# Patient Record
Sex: MALE | Race: WHITE
[De-identification: names, ages, dates, MRNs, and addresses within clinical notes are randomized per-mention and may not be internally consistent; named-entity substitution may affect disease eponyms.]

---
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
## 2022-05-19 ENCOUNTER — EMERGENCY (EMERGENCY)
Facility: HOSPITAL | Age: 22
LOS: 0 days | Discharge: HOME | End: 2022-05-19
Attending: EMERGENCY MEDICINE | Admitting: EMERGENCY MEDICINE
Payer: COMMERCIAL

## 2022-05-19 VITALS
WEIGHT: 197.09 LBS | TEMPERATURE: 99 F | SYSTOLIC BLOOD PRESSURE: 130 MMHG | HEART RATE: 83 BPM | DIASTOLIC BLOOD PRESSURE: 64 MMHG | RESPIRATION RATE: 17 BRPM | OXYGEN SATURATION: 100 %

## 2022-05-19 VITALS
RESPIRATION RATE: 18 BRPM | DIASTOLIC BLOOD PRESSURE: 76 MMHG | OXYGEN SATURATION: 100 % | TEMPERATURE: 99 F | SYSTOLIC BLOOD PRESSURE: 127 MMHG | HEART RATE: 61 BPM

## 2022-05-19 DIAGNOSIS — R19.7 DIARRHEA, UNSPECIFIED: ICD-10-CM

## 2022-05-19 DIAGNOSIS — T88.6XXA ANAPHYLACTIC REACTION DUE TO ADVERSE EFFECT OF CORRECT DRUG OR MEDICAMENT PROPERLY ADMINISTERED, INITIAL ENCOUNTER: ICD-10-CM
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
## 2022-05-19 DIAGNOSIS — Z90.49 ACQUIRED ABSENCE OF OTHER SPECIFIED PARTS OF DIGESTIVE TRACT: Chronic | ICD-10-CM

## 2022-05-19 DIAGNOSIS — T36.3X5A ADVERSE EFFECT OF MACROLIDES, INITIAL ENCOUNTER: ICD-10-CM
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
## 2022-05-19 DIAGNOSIS — R20.2 PARESTHESIA OF SKIN: ICD-10-CM

## 2022-05-19 DIAGNOSIS — R11.2 NAUSEA WITH VOMITING, UNSPECIFIED: ICD-10-CM

## 2022-05-19 DIAGNOSIS — F17.290 NICOTINE DEPENDENCE, OTHER TOBACCO PRODUCT, UNCOMPLICATED: ICD-10-CM

## 2022-05-19 DIAGNOSIS — R10.9 UNSPECIFIED ABDOMINAL PAIN: ICD-10-CM

## 2022-05-19 PROCEDURE — 99291 CRITICAL CARE FIRST HOUR: CPT

## 2022-05-19 RX ORDER — ONDANSETRON 8 MG/1
4 TABLET, FILM COATED ORAL ONCE
Refills: 0 | Status: COMPLETED | OUTPATIENT
Start: 2022-05-19 | End: 2022-05-19

## 2022-05-19 RX ORDER — SODIUM CHLORIDE 9 MG/ML
1000 INJECTION, SOLUTION INTRAVENOUS ONCE
Refills: 0 | Status: COMPLETED | OUTPATIENT
Start: 2022-05-19 | End: 2022-05-19

## 2022-05-19 RX ORDER — ONDANSETRON 8 MG/1
4 TABLET, FILM COATED ORAL ONCE
Refills: 0 | Status: DISCONTINUED | OUTPATIENT
Start: 2022-05-19 | End: 2022-05-19

## 2022-05-19 RX ORDER — EPINEPHRINE 0.3 MG/.3ML
0.3 INJECTION INTRAMUSCULAR; SUBCUTANEOUS ONCE
Refills: 0 | Status: COMPLETED | OUTPATIENT
Start: 2022-05-19 | End: 2022-05-19

## 2022-05-19 RX ORDER — FAMOTIDINE 10 MG/ML
20 INJECTION INTRAVENOUS ONCE
Refills: 0 | Status: COMPLETED | OUTPATIENT
Start: 2022-05-19 | End: 2022-05-19

## 2022-05-19 RX ORDER — DIPHENHYDRAMINE HCL 50 MG
50 CAPSULE ORAL ONCE
Refills: 0 | Status: COMPLETED | OUTPATIENT
Start: 2022-05-19 | End: 2022-05-19

## 2022-05-19 RX ORDER — FAMOTIDINE 10 MG/ML
20 INJECTION INTRAVENOUS ONCE
Refills: 0 | Status: DISCONTINUED | OUTPATIENT
Start: 2022-05-19 | End: 2022-05-19

## 2022-05-19 RX ORDER — DIPHENHYDRAMINE HCL 50 MG
50 CAPSULE ORAL ONCE
Refills: 0 | Status: DISCONTINUED | OUTPATIENT
Start: 2022-05-19 | End: 2022-05-19

## 2022-05-19 RX ORDER — EPINEPHRINE 0.3 MG/.3ML
0.3 INJECTION INTRAMUSCULAR; SUBCUTANEOUS
Qty: 2 | Refills: 0
Start: 2022-05-19

## 2022-05-19 RX ADMIN — Medication 125 MILLIGRAM(S): at 13:48

## 2022-05-19 RX ADMIN — SODIUM CHLORIDE 1000 MILLILITER(S): 9 INJECTION, SOLUTION INTRAVENOUS at 13:54

## 2022-05-19 RX ADMIN — Medication 50 MILLIGRAM(S): at 13:48

## 2022-05-19 RX ADMIN — FAMOTIDINE 104 MILLIGRAM(S): 10 INJECTION INTRAVENOUS at 13:48

## 2022-05-19 RX ADMIN — ONDANSETRON 4 MILLIGRAM(S): 8 TABLET, FILM COATED ORAL at 13:48

## 2022-05-19 RX ADMIN — EPINEPHRINE 0.3 MILLIGRAM(S): 0.3 INJECTION INTRAMUSCULAR; SUBCUTANEOUS at 13:54

## 2022-05-19 NOTE — ED PROVIDER NOTE - CARE PROVIDER_API CALL
Chichi Pierce  ALLERGY AND IMMUNOLOGY  [address]  [place] [address]  Phone: [phone]  Fax: [phone]  Follow Up Time: 1-3 Days

## 2022-05-19 NOTE — ED PROVIDER NOTE - PHYSICAL EXAMINATION
_  Vital signs reviewed; ABCs intact  GENERAL: Well nourished, comfortable  SKIN: Warm, dry  HEAD & NECK: NCAT, supple neck  EYES: EOMI, PER B/L, no scleral icterus, no conjunctival injection, no conjunctival pallor  ENT: MMM; uvula midline; no oropharyngeal erythema or exudates  CARD: RRR, S1, S2; no murmurs, no rubs, no gallops  RESP: Normal respiratory effort, CTAB, no rales, no wheezing  ABD: Soft, ND, NT, no rebound, no guarding, +BS; no CVAT  EXT: No edema; pulses palpable distally; no calf tenderness; symmetrical calf circumferences  NEUROMSK: Grossly intact  PSYCH: AAOx3, cooperative, appropriate

## 2022-05-19 NOTE — ED ADULT NURSE NOTE - OBJECTIVE STATEMENT
Pt c/o abdominal pain, vomiting & diarrhea ~ 20-25 minutes s/p taking Azithromycin prescribed to treat current URI. Emesis NBNB, diarrhea non-bloody. Denies fever/chills. No prior home remedies.

## 2022-05-19 NOTE — ED PROVIDER NOTE - ATTENDING CONTRIBUTION TO CARE
21-year-old male with no past medical history no known allergies, presenting with possible allergic reaction.  At around 930 this morning patient took 2 tablets of azithromycin and about 25 to 30 minutes after he started complaining of some abdominal pain, and had episodes of diarrhea and vomiting.  Nonbloody nonbilious emesis and nonbloody diarrhea.  Patient feels improved now.  Patient also noted he had some lip tingling and mild swelling compared to normal per the father.  No throat itching or throat tightening.  No other rash.  No fever.  Patient was started on the azithromycin by urgent care for congestion and thick nasal discharge.  Patient has had this chronic nasal congestion and discharge for about 2 months.  Exam - Gen - NAD, Head - NCAT, Pharynx - clear, MMM, no edema, lips with mildly more erythematous than normal and slightly more full than normal, Heart - RRR, no m/g/r, Lungs - CTAB, no w/c/r, Abdomen - soft, NT, ND, Skin - No rash, Extremities - FROM, no edema, erythema, ecchymosis, Neuro - CN 2-12 intact, nl strength and sensation, nl gait.  Diagnosis–possible anaphylaxis as to systems are involved.  Plan–epi, Benadryl, Pepcid, Zofran, steroids.  Patient to be absent for 4 hours status post epinephrine.  Advised patient avoid azithromycin and follow-up with allergy for testing. 21-year-old male with no past medical history no known allergies, presenting with possible allergic reaction.  At around 930 this morning patient took 2 tablets of azithromycin and about 25 to 30 minutes after he started complaining of some abdominal pain, and had episodes of diarrhea and vomiting.  Nonbloody nonbilious emesis and nonbloody diarrhea.  Patient feels improved now.  Patient also noted he had some lip tingling and mild swelling compared to normal per the father.  No throat itching or throat tightening.  No other rash.  No fever.  Patient was started on the azithromycin by urgent care for congestion and thick nasal discharge.  Patient has had this chronic nasal congestion and discharge for about 2 months.  Exam - Gen - NAD, Head - NCAT, Pharynx - clear, MMM, no edema, lips with mildly more erythematous than normal and slightly more full than normal, Heart - RRR, no m/g/r, Lungs - CTAB, no w/c/r, Abdomen - soft, NT, ND, Skin - No rash, Extremities - FROM, no edema, erythema, ecchymosis, Neuro - CN 2-12 intact, nl strength and sensation, nl gait.  Diagnosis–possible anaphylaxis as to systems are involved.  Plan–epi, Benadryl, Pepcid, Zofran, steroids.  Patient to be observed for 4 hours status post epinephrine. Pt did well, improved rash, no vomiting. Pt d/aye home and advised PMD f/u. Advised patient avoid azithromycin and follow-up with allergy for testing.

## 2022-05-19 NOTE — ED PROVIDER NOTE - NSFOLLOWUPINSTRUCTIONS_ED_ALL_ED_FT
Your visit in the emergency department today did not reveal anything immediately life-threatening.    However, it is important that you follow-up with your PRIMARY CARE PHYSICIAN within 3-5 days.    Additionally, it is important that you follow-up with ALLERGIST. If you are unable to schedule a visit(s) with the provided specialist(s), please speak with your primary care doctor for guidance to such a specialist.    ---------------------------------------------------------------------------------------------------    Anaphylaxis    An anaphylactic reaction (anaphylaxis) is a sudden, severe allergic reaction that affects multiple areas of the body. An allergic reaction is an abnormal reaction to a substance (allergen) by the body's defense system. Common allergens include medicines, food, insect bites or stings, and blood products. The body releases certain proteins into the blood that can cause a variety of symptoms such as an itchy rash, wheezing, swelling of the face/lips/tongue/throat, abdominal pain, nausea or vomiting. An allergic reaction is usually treated with medication. If your health care provider prescribed you an epinephrine injection device, make sure to keep it with you at all times.    SEEK IMMEDIATE MEDICAL CARE IF YOU HAVE ANY OF THE FOLLOWING SYMPTOMS: allergic reaction severe enough that required you to use epinephrine, tightness in your chest, swelling around your lips/tongue/throat, abdominal pain, vomiting or diarrhea, or lightheadedness/dizziness. These symptoms may represent a serious problem that is an emergency. Do not wait to see if the symptoms will go away. Use your auto-injector pen or anaphylaxis kit as you have been instructed. Call 911 and do not drive yourself to the hospital.    ---------------------------------------------------------------------------------------------------    How to Use an Auto-Injector Pen    An auto-injector pen (pre-filled automatic epinephrine injection device) is a device that is used to deliver epinephrine to the body. Epinephrine is a medicine that is given as a shot (injection). It works by relaxing the muscles in the airways and tightening the blood vessels. It is used to treat:  •A life-threatening allergic reaction (anaphylaxis).  •Serious breathing problems, such as severe asthma attacks, some lung problems, and other emergency conditions.    An epinephrine injection can save your life. You should always carry an auto-injector pen with you if you are at risk for severe asthma attacks or anaphylaxis. You may hear other names for an auto-injector pen. They are epinephrine injection, epinephrine auto-injector pen, epinephrine pen, and automatic injection device.    What are the risks?    Using the auto-injector pen is safe. However, problems may arise, including:  •Damage to bone or tissue. Make sure that you correctly place the needle in the muscle of your outer thigh as told by your health care provider.    When should I use my auto-injector pen?  Use your auto-injector pen as soon as you think you are experiencing anaphylaxis or a severe asthma attack. Anaphylaxis is very dangerous if it is not treated right away.    Signs and symptoms of anaphylaxis may include:  •Feeling warm in the face (flushed). This may include redness.  •Itchy, red, swollen areas of skin (hives).  •Swelling of the eyes, lips, face, mouth, tongue, or throat.  •Difficulty breathing, speaking, or swallowing.  •Noisy breathing (wheezing).  •Dizziness or light-headedness.  •Fainting.  •Pain or cramping in the abdomen.  •Vomiting.  •Diarrhea.    These symptoms may represent a serious problem that is an emergency. Do not wait to see if the symptoms will go away. Use your auto-injector pen as you have been instructed, and get medical help right away. Call your local emergency services (911 in the [place] Do not drive yourself to the hospital.    General tips for using an auto-injector pen  •Use epinephrine exactly as told by your health care provider. Do not inject it more often or in greater or smaller doses than your health care provider told you. Most auto-injector pens contain one dose of epinephrine. Some contain two doses.  •Use the auto-injector pen to give yourself an injection under your skin or into your muscle on the outer side of your thigh. Do not give yourself an injection into your buttocks or any other part of your body.  •In an emergency, you can use your auto-injector pen through your clothing.  •After you inject a dose of epinephrine, some liquid may remain in your auto-injector pen. This is normal.  •If you need to give yourself a second dose of epinephrine, give the second injection in another area on your outer thigh. Do not give two injections in exactly the same place on your body. This can lead to tissue damage.  •From time to time:   •Check the expiration date on your auto-injector pen.   •Check the solution to ensure that it is not cloudy and that there are no particles floating in it. If your auto-injector pen is  or if the solution is cloudy or has particles floating in it, throw it away and get a new one.  •Ask your health care provider how to safely get rid of used or  auto-injector pens.  •Talk with your pharmacist or health care provider if you have questions about how to inject epinephrine correctly.    Get help right away if:  •You inject epinephrine. You may need additional medical care, and you may need to be monitored for the side effects of epinephrine. The side effects include:  •Difficulty breathing.  •Fast or irregular heartbeat.  •Nausea or vomiting.  •Sweating.  •Dizziness.  •Nervousness or anxiety.  •Weakness.  •Pale skin.  •Headache.  •Shaking that does not stop.    Summary  •An auto-injector pen (pre-filled automatic epinephrine injection device) is a device that is used to deliver epinephrine to the body.  •An auto-injector pen is used to treat a life-threatening allergic reaction (anaphylaxis), asthma attack, or other emergency conditions.  •You should always carry an auto-injector pen with you if you are at risk for anaphylaxis or severe asthma attacks.  •Use of this device is safe. However, bone or tissue damage can occur if you do not follow instructions for injecting the medicine.  •Talk with your pharmacist or health care provider if you have questions about how to inject epinephrine correctly.    This information is not intended to replace advice given to you by your health care provider. Make sure you discuss any questions you have with your health care provider.    Document Revised: 2020 Document Reviewed: 2020  Elsevier Patient Education ©  [employer] Your visit in the emergency department today did not reveal anything immediately life-threatening.    However, it is important that you follow-up with your PRIMARY CARE PHYSICIAN within 3-5 days.    You MAY or MAY NOT have had an allergic reaction to Azithromycin. Avoid the medication until you are evaluated by an ALLERGIST.     If you are unable to schedule a visit(s) with the provided specialist(s), please speak with your primary care doctor for guidance to such a specialist.    ---------------------------------------------------------------------------------------------------    Anaphylaxis    An anaphylactic reaction (anaphylaxis) is a sudden, severe allergic reaction that affects multiple areas of the body. An allergic reaction is an abnormal reaction to a substance (allergen) by the body's defense system. Common allergens include medicines, food, insect bites or stings, and blood products. The body releases certain proteins into the blood that can cause a variety of symptoms such as an itchy rash, wheezing, swelling of the face/lips/tongue/throat, abdominal pain, nausea or vomiting. An allergic reaction is usually treated with medication. If your health care provider prescribed you an epinephrine injection device, make sure to keep it with you at all times.    SEEK IMMEDIATE MEDICAL CARE IF YOU HAVE ANY OF THE FOLLOWING SYMPTOMS: allergic reaction severe enough that required you to use epinephrine, tightness in your chest, swelling around your lips/tongue/throat, abdominal pain, vomiting or diarrhea, or lightheadedness/dizziness. These symptoms may represent a serious problem that is an emergency. Do not wait to see if the symptoms will go away. Use your auto-injector pen or anaphylaxis kit as you have been instructed. Call 911 and do not drive yourself to the hospital.    ---------------------------------------------------------------------------------------------------    How to Use an Auto-Injector Pen    An auto-injector pen (pre-filled automatic epinephrine injection device) is a device that is used to deliver epinephrine to the body. Epinephrine is a medicine that is given as a shot (injection). It works by relaxing the muscles in the airways and tightening the blood vessels. It is used to treat:  •A life-threatening allergic reaction (anaphylaxis).  •Serious breathing problems, such as severe asthma attacks, some lung problems, and other emergency conditions.    An epinephrine injection can save your life. You should always carry an auto-injector pen with you if you are at risk for severe asthma attacks or anaphylaxis. You may hear other names for an auto-injector pen. They are epinephrine injection, epinephrine auto-injector pen, epinephrine pen, and automatic injection device.    What are the risks?    Using the auto-injector pen is safe. However, problems may arise, including:  •Damage to bone or tissue. Make sure that you correctly place the needle in the muscle of your outer thigh as told by your health care provider.    When should I use my auto-injector pen?  Use your auto-injector pen as soon as you think you are experiencing anaphylaxis or a severe asthma attack. Anaphylaxis is very dangerous if it is not treated right away.    Signs and symptoms of anaphylaxis may include:  •Feeling warm in the face (flushed). This may include redness.  •Itchy, red, swollen areas of skin (hives).  •Swelling of the eyes, lips, face, mouth, tongue, or throat.  •Difficulty breathing, speaking, or swallowing.  •Noisy breathing (wheezing).  •Dizziness or light-headedness.  •Fainting.  •Pain or cramping in the abdomen.  •Vomiting.  •Diarrhea.    These symptoms may represent a serious problem that is an emergency. Do not wait to see if the symptoms will go away. Use your auto-injector pen as you have been instructed, and get medical help right away. Call your local emergency services (911 in the [place] Do not drive yourself to the hospital.    General tips for using an auto-injector pen  •Use epinephrine exactly as told by your health care provider. Do not inject it more often or in greater or smaller doses than your health care provider told you. Most auto-injector pens contain one dose of epinephrine. Some contain two doses.  •Use the auto-injector pen to give yourself an injection under your skin or into your muscle on the outer side of your thigh. Do not give yourself an injection into your buttocks or any other part of your body.  •In an emergency, you can use your auto-injector pen through your clothing.  •After you inject a dose of epinephrine, some liquid may remain in your auto-injector pen. This is normal.  •If you need to give yourself a second dose of epinephrine, give the second injection in another area on your outer thigh. Do not give two injections in exactly the same place on your body. This can lead to tissue damage.  •From time to time:   •Check the expiration date on your auto-injector pen.   •Check the solution to ensure that it is not cloudy and that there are no particles floating in it. If your auto-injector pen is  or if the solution is cloudy or has particles floating in it, throw it away and get a new one.  •Ask your health care provider how to safely get rid of used or  auto-injector pens.  •Talk with your pharmacist or health care provider if you have questions about how to inject epinephrine correctly.    Get help right away if:  •You inject epinephrine. You may need additional medical care, and you may need to be monitored for the side effects of epinephrine. The side effects include:  •Difficulty breathing.  •Fast or irregular heartbeat.  •Nausea or vomiting.  •Sweating.  •Dizziness.  •Nervousness or anxiety.  •Weakness.  •Pale skin.  •Headache.  •Shaking that does not stop.    Summary  •An auto-injector pen (pre-filled automatic epinephrine injection device) is a device that is used to deliver epinephrine to the body.  •An auto-injector pen is used to treat a life-threatening allergic reaction (anaphylaxis), asthma attack, or other emergency conditions.  •You should always carry an auto-injector pen with you if you are at risk for anaphylaxis or severe asthma attacks.  •Use of this device is safe. However, bone or tissue damage can occur if you do not follow instructions for injecting the medicine.  •Talk with your pharmacist or health care provider if you have questions about how to inject epinephrine correctly.    This information is not intended to replace advice given to you by your health care provider. Make sure you discuss any questions you have with your health care provider.    Document Revised: 2020 Document Reviewed: 2020  Elsevier Patient Education ©  [employer]

## 2022-05-19 NOTE — ED PROVIDER NOTE - OBJECTIVE STATEMENT
Patient is a 21 year old male, without any past medical history, presenting for a potential reaction to Azithromycin 500 mg, ~0930 today. Approximately 30 minutes after taking the first dose of Azithromycin, patient developed lip tingling, sensation of his lips being "pursed," NBNB emesis x multiple times, Patient is a 21 year old male, without any past medical history, presenting for a potential reaction to Azithromycin 500 mg, ~0930 today. Approximately 30 minutes after taking the first dose of Azithromycin, patient developed lip tingling, sensation of his lips being "pursed," NBNB emesis x multiple times, NB diarrhea. Patient tried to take his mother's anti-nausea medication (unsure of name, but was unable to keep it down). No chest tightness, angioedema, abdominal pain, lightheadedness/dizziness, rash, pruritis. Patient and father are not sure if he has taken this medication in the past. No known allergens.   Of note, the reason the patient is taking Azithromycin is because he has been having a cough for 2 months, not associated with fever. PMD is following. Patient is a daily tobacco vape user. Patient is a 21 year old male, without any past medical history, presenting for a potential reaction to Azithromycin 500 mg, ~0930 today. Approximately 30 minutes after taking the first dose of Azithromycin, patient developed lip tingling, sensation of his lips being "pursed," NBNB emesis x multiple times, NB diarrhea. Patient tried to take his mother's anti-nausea medication (unsure of name, but was unable to keep it down). No chest tightness, angioedema, abdominal pain, lightheadedness/dizziness, rash, pruritis. Patient and father are not sure if he has taken this medication in the past. No known allergens.   Of note, the reason the patient is taking Azithromycin is because he has been having a cough for 2 months, not associated with fever. PMD is following. Patient is a daily tobacco vape user.  No other complaints - no sore throat, CP, palpitations, SOB, cough, dysuria, hematuria, new joint pain, FND, trauma.

## 2022-05-19 NOTE — ED ADULT TRIAGE NOTE - CHIEF COMPLAINT QUOTE
"I took 2 tablets of Zithromax at 9:30 this morning and I started feeling sick 20-25 minutes after that. My belly hurt & I had diarrhea and vomited."

## 2022-05-19 NOTE — ED PROVIDER NOTE - PROGRESS NOTE DETAILS
Resident AO: Had a discussion with patient and father at bedside regarding tobacco and vape use, and its dangers.   Patient received epinephrine, and will be observed until 18:00. Resident AO: Patient asymptomatic and stable. Will continue to monitor. Resident AO: Patient remains asymptomatic through ED observation period. Feels better and has no complaints. Dad and patient comfortable with discharge, and agree to follow up with PMD and allergist. Return precautions given. Epinephrine pen sent to pharmacy.

## 2022-05-19 NOTE — ED PROVIDER NOTE - CLINICAL SUMMARY MEDICAL DECISION MAKING FREE TEXT BOX
21-year-old male with no past medical history no known allergies, presenting with possible allergic reaction.  At around 930 this morning patient took 2 tablets of azithromycin and about 25 to 30 minutes after he started complaining of some abdominal pain, and had episodes of diarrhea and vomiting.  Nonbloody nonbilious emesis and nonbloody diarrhea.  Patient feels improved now.  Patient also noted he had some lip tingling and mild swelling compared to normal per the father.  No throat itching or throat tightening.  No other rash.  No fever.  Patient was started on the azithromycin by urgent care for congestion and thick nasal discharge.  Patient has had this chronic nasal congestion and discharge for about 2 months.  Exam - Gen - NAD, Head - NCAT, Pharynx - clear, MMM, no edema, lips with mildly more erythematous than normal and slightly more full than normal, Heart - RRR, no m/g/r, Lungs - CTAB, no w/c/r, Abdomen - soft, NT, ND, Skin - No rash, Extremities - FROM, no edema, erythema, ecchymosis, Neuro - CN 2-12 intact, nl strength and sensation, nl gait.  Diagnosis–possible anaphylaxis as to systems are involved.  Plan–epi, Benadryl, Pepcid, Zofran, steroids.  Patient to be observed for 4 hours status post epinephrine. Pt did well, improved rash, no vomiting. Pt d/aye home and advised PMD f/u. Advised patient avoid azithromycin and follow-up with allergy for testing.

## 2022-05-19 NOTE — ED PROVIDER NOTE - PATIENT PORTAL LINK FT
You can access the FollowMyHealth Patient Portal offered by [facility] by registering at the following website: [email or web address]. By joining [facility]’s FollowMyHealth portal, you will also be able to view your health information using other applications (apps) compatible with our system.

## 2022-05-19 NOTE — ED ADULT NURSE NOTE - NSIMPLEMENTINTERV_GEN_ALL_ED
Implemented All Universal Safety Interventions:  [place] to call system. Call bell, personal items and telephone within reach. Instruct patient to call for assistance. Room bathroom lighting operational. Non-slip footwear when patient is off stretcher. Physically safe environment: no spills, clutter or unnecessary equipment. Stretcher in lowest position, wheels locked, appropriate side rails in place.

## 2023-02-23 ENCOUNTER — APPOINTMENT (OUTPATIENT)
Dept: ORTHOPEDIC SURGERY | Facility: CLINIC | Age: 23
End: 2023-02-23
Payer: COMMERCIAL

## 2023-02-23 VITALS — HEIGHT: 72 IN | WEIGHT: 195 LBS | BODY MASS INDEX: 26.41 KG/M2

## 2023-02-23 DIAGNOSIS — S62.336A DISPLACED FRACTURE OF NECK OF FIFTH METACARPAL BONE, RIGHT HAND, INITIAL ENCOUNTER FOR CLOSED FRACTURE: ICD-10-CM

## 2023-02-23 PROCEDURE — 29085 APPL CAST HAND&LWR FOREARM: CPT | Mod: RT

## 2023-02-23 PROCEDURE — 99203 OFFICE O/P NEW LOW 30 MIN: CPT | Mod: 25

## 2023-02-23 PROCEDURE — 73130 X-RAY EXAM OF HAND: CPT | Mod: 76,RT

## 2023-02-23 NOTE — IMAGING
[de-identified] : On examination of the right hand swelling and resolving ecchymosis noted to the dorsal lateral hand.  Tenderness over the fifth metacarpal head and neck.  No tenderness over the first, second third, fourth metacarpal.  No tenderness over the fingers.  He has a rotational deformity noted to the pinky finger.  No tenderness to the wrist, good range of motion of the wrist.\par \par X-ray brought in with him today on disc, urgent care was only 1 view lateral, repeat x-ray today right hand 3 views-volar angulated fifth metacarpal neck fracture.  Repeat x-ray in cast post reduction with improved alignment

## 2023-02-23 NOTE — ASSESSMENT
[FreeTextEntry1] : Commended a reduction, patient agreed.  He kindly declined a lidocaine block.  He was placed into a dorsal blocking cast.  He is to keep the cast dry.  Anti-inflammatory as needed.  Follow-up in 1 week with Dr. Ma.\par \par This patient was seen under the supervision of Dr. Calderon.\par

## 2023-02-23 NOTE — HISTORY OF PRESENT ILLNESS
[de-identified] : 22-year-old male comes in today for evaluation of his right hand pain and injury that occurred on February 16.  Patient states that he punched a door frame, few days later he went to urgent care center, x-ray was done he was told he had a fracture and was placed into a splint.  He is right-hand dominant.

## 2023-02-24 PROBLEM — Z78.9 OTHER SPECIFIED HEALTH STATUS: Chronic | Status: ACTIVE | Noted: 2022-05-19

## 2023-03-13 ENCOUNTER — APPOINTMENT (OUTPATIENT)
Dept: ORTHOPEDIC SURGERY | Facility: CLINIC | Age: 23
End: 2023-03-13
Payer: COMMERCIAL

## 2023-03-13 VITALS — HEIGHT: 72 IN | BODY MASS INDEX: 26.41 KG/M2 | WEIGHT: 195 LBS

## 2023-03-13 DIAGNOSIS — S62.336D DISPLACED FRACTURE OF NECK OF FIFTH METACARPAL BONE, RIGHT HAND, SUBSEQUENT ENCOUNTER FOR FRACTURE WITH ROUTINE HEALING: ICD-10-CM

## 2023-03-13 PROCEDURE — 73130 X-RAY EXAM OF HAND: CPT | Mod: RT

## 2023-03-13 PROCEDURE — 99213 OFFICE O/P EST LOW 20 MIN: CPT

## 2023-03-13 NOTE — ASSESSMENT
[FreeTextEntry1] : Pt Is healed his fracture well.  Come out of immobilization range of motion exercise instruction exercises.  See us back on an as-needed basis knows to take 6 weeks for full fracture consolidation.

## 2023-03-13 NOTE — DATA REVIEWED
[FreeTextEntry1] : Radiographs 3 views of the right hand in the cast show good position alignment and healing of his fifth metacarpal neck fracture

## 2023-03-13 NOTE — HISTORY OF PRESENT ILLNESS
[de-identified] : 22-year-old male status post right fifth metacarpal fracture the injury is 3 weeks old and comes in today for evaluation no complaints in his cast.

## 2023-03-13 NOTE — PHYSICAL EXAM
[de-identified] : Patient is removed from his cast and has excellent range of motion.  He has minimal deformity normal sensation normal cap refill.

## 2023-04-05 ENCOUNTER — EMERGENCY (EMERGENCY)
Facility: HOSPITAL | Age: 23
LOS: 0 days | Discharge: HOME | End: 2023-04-05
Attending: PEDIATRICS
Payer: COMMERCIAL

## 2023-04-05 VITALS
TEMPERATURE: 98 F | SYSTOLIC BLOOD PRESSURE: 118 MMHG | HEART RATE: 75 BPM | RESPIRATION RATE: 16 BRPM | OXYGEN SATURATION: 100 % | DIASTOLIC BLOOD PRESSURE: 77 MMHG | WEIGHT: 191.8 LBS

## 2023-04-05 DIAGNOSIS — L76.82 OTHER POSTPROCEDURAL COMPLICATIONS OF SKIN AND SUBCUTANEOUS TISSUE: ICD-10-CM

## 2023-04-05 DIAGNOSIS — L05.92 PILONIDAL SINUS WITHOUT ABSCESS: ICD-10-CM

## 2023-04-05 DIAGNOSIS — Z98.890 OTHER SPECIFIED POSTPROCEDURAL STATES: Chronic | ICD-10-CM

## 2023-04-05 PROCEDURE — 10080 I&D PILONIDAL CYST SIMPLE: CPT

## 2023-04-05 PROCEDURE — 99284 EMERGENCY DEPT VISIT MOD MDM: CPT | Mod: 25

## 2023-04-05 PROCEDURE — 76882 US LMTD JT/FCL EVL NVASC XTR: CPT | Mod: LT

## 2023-04-05 PROCEDURE — 76882 US LMTD JT/FCL EVL NVASC XTR: CPT | Mod: 26,LT

## 2023-04-05 NOTE — ED PROVIDER NOTE - NS ED ATTENDING STATEMENT MOD
This was a shared visit with the BASILIO. I reviewed and verified the documentation and independently performed the documented:

## 2023-04-05 NOTE — ED PROVIDER NOTE - OBJECTIVE STATEMENT
Pt is a 23 y/o male with PMHx of pilonidal cyst drainage with tract formation in 11/2022 presenting for possible infection to pilonidal sinus tract. Pt had operation done, saw outpatient surgeon for 2-3 visits, then had prolonged issues with appointment scheduling. Pt spoke to Pediatrician who recommended ED visit and pt/pt's family requesting formation of new surgical follow up. He admits to some pain to the area for the past 6 months with no worsening symptoms at this time. Pt is currently not on antibiotics. He denies any fever, chills, cough, sore throat, N/V/D/C, abdominal pain, CP, SoB, or urinary complaints.

## 2023-04-05 NOTE — ED PROVIDER NOTE - PHYSICAL EXAMINATION
As Follows:  CONST: Well appearing in NAD.   EYES: EOMI, Sclera and conjunctiva clear.  CARD: Normal S1 S2; Normal rate and rhythm  RESP: Equal BS B/L, No wheezes, rhonchi or rales. No distress  GI: Soft, non-tender, non-distended.  SKIN: Pilonidal sinus tract formation s/p cyst drainage, mild drainage, tenderness, and erythema. Gauze and tape placed after examination. Otherwise warm, dry, no acute rashes. Good turgor  NEURO: A&Ox3, No focal deficits.

## 2023-04-05 NOTE — ED PROVIDER NOTE - NPI NUMBER (FOR SYSADMIN USE ONLY) :
Subjective





- Date & Time of Evaluation


Date of Evaluation: 04/08/19


Time of Evaluation: 09:35





- Subjective


Subjective: 





Patient seen and evaluated


s/p stress test and ECHO in am





Review Of Systems


Constitutional: Positive for: Fever, Chills


Cardiovascular: Negative for: Chest Pain


Respiratory: Negative for: Shortness of Breath


Gastrointestinal: Negative for: Nausea, Vomiting


Musculoskeletal: Positive for: Leg Pain (RLE)


Neurological: Negative for: Headache





Physical Exam





- Physical Exam


Appears: No Acute Distress, Other (pale)


Skin: Normal Color, Warm, Dry


Head: Atraumatic, Normacephalic


Eye(s): bilateral: Conjunctiva Pale


Oral Mucosa: Moist


Neck: Normal ROM, Supple


Chest: Symmetrical, No Deformity


Cardiovascular: Rhythm Regular, No Murmur


Respiratory: Normal Breath Sounds, No Rales, No Rhonchi, No Wheezing


Gastrointestinal/Abdominal: Soft, No Tenderness


Extremity: Other (Right lower extremity nonhealing wound to lateral plantar 

surface of foot and smaller wound to the 3rd toe at base of nail, skin around 

RLE indurated, warm, tender, chronic venostasis changes. Left lower extremity 

BKA)


Neurological/Psych: Oriented x3, Normal Speech, Normal Cognition





Assessment & Plan





- Assessment and Plan (Free Text)


Assessment: 





(1) Osteomyelitis


Status: Acute   





(2) Cellulitis of right leg


Status: Acute   





(3) Chronic renal insufficiency


Status: Acute   





(4) Diabetic ulcer of foot associated with diabetes mellitus due to underlying 

condition, limited to breakdown of skin


Status: Acute   





(5) Leg pain


Status: Acute   





(6) Musculoskeletal pain


Status: Acute   








Stress test: Normal


ECHO: Normal EF





Low risk for cardiac events for foot surgery under general anaesthesia or 

Conscious sedation








Objective





- Vital Signs/Intake and Output


Vital Signs (last 24 hours): 


                                        











Temp Pulse Resp BP Pulse Ox


 


 98.7 F   82   20   161/79 H  95 


 


 04/08/19 16:13  04/08/19 16:13  04/08/19 16:13  04/08/19 16:13  04/08/19 16:13











- Medications


Medications: 


                               Current Medications





Amlodipine Besylate (Norvasc)  2.5 mg PO DAILY [facility]


   Last Admin: 04/08/19 14:00 Dose:  2.5 mg


Enalapril Maleate (Vasotec)  2.5 mg PO DAILY [facility]


   Last Admin: 04/08/19 14:03 Dose:  2.5 mg


Gabapentin (Neurontin)  100 mg PO QID [facility]


   Last Admin: 04/08/19 21:49 Dose:  100 mg


Cefepime HCl (Maxipime Iv 1 Gm Premix)  1 gm in 50 mls @ 100 mls/hr IVPB Q12H 

[facility]; Protocol


   Last Admin: 04/08/19 20:00 Dose:  100 mls/hr


Daptomycin 400 mg/ Sodium (Chloride)  100 mls @ 100 mls/hr IV Q24H [facility]; Protocol


   Stop: 04/13/19 14:01


   Last Admin: 04/08/19 14:53 Dose:  100 mls/hr


Insulin Human Regular (Novolin R)  0 unit SC ACHS [facility]; Protocol


   Last Admin: 04/08/19 21:50 Dose:  Not Given


Pantoprazole Sodium (Protonix Ec Tab)  40 mg PO DAILY [facility]


   Last Admin: 04/08/19 14:00 Dose:  40 mg


Sertraline HCl (Zoloft)  150 mg PO DAILY [facility]


   Last Admin: 04/08/19 14:00 Dose:  150 mg


Sodium Hypochlorite (Dakins Solution 0.125%)  0 appl TOP DAILY [facility]


   Last Admin: 04/08/19 09:53 Dose:  Not Given











- Labs


Labs: 


                                        





                                 04/08/19 06:44 





                                 04/08/19 06:44 





                                        











PT  12.1 SECONDS (9.7-12.2)   04/08/19  20:59    


 


INR  1.1   04/08/19  20:59    


 


APTT  30 SECONDS (21-34)   04/08/19  20:59 [[id number]]

## 2023-04-05 NOTE — ED PROVIDER NOTE - CARE PROVIDER_API CALL
Familia Nash)  Surgery  Colorectal  [address] [facility], [address] [place] [address]  Phone: [phone]  Fax: [phone]  Follow Up Time:

## 2023-04-05 NOTE — ED PROVIDER NOTE - NSFOLLOWUPINSTRUCTIONS_ED_ALL_ED_FT
Please follow up with Surgery outpatient in 1-2 weeks.   Please follow up with your Primary Care Provider as well.   Return to the ED for worsening symptoms.     Pilonidal Cyst    A pilonidal cyst is a fluid-filled sac that forms beneath the skin near the tailbone, at the top of the crease of the buttocks (pilonidal area). If the cyst is not large and not infected, it may not cause any problems.    If the cyst becomes irritated or infected, it may get larger and fill with pus. An infected cyst is called an abscess. A pilonidal abscess may cause pain and swelling, and it may need to be drained or removed.    What are the causes?  The cause of this condition is not always known. In some cases, a hair that grows into your skin (ingrown hair) may be the cause.    What increases the risk?  You are more likely to get a pilonidal cyst if you:  Are male.  Have lots of hair near the crease of the buttocks.  Are overweight.  Have a dimple near the crease of the buttocks.  Wear tight clothing.  Do not bathe or shower often.  Sit for long periods of time.  What are the signs or symptoms?  Signs and symptoms of a pilonidal cyst may include pain, swelling, redness, and warmth in the pilonidal area. Depending on how big the cyst is, you may be able to feel a lump near your tailbone. If your cyst becomes infected, symptoms may include:  Pus or fluid drainage.  Fever.  Pain, swelling, and redness getting worse.  The lump getting bigger.  How is this diagnosed?  This condition may be diagnosed based on:  Your symptoms and medical history.  A physical exam.  A blood test to check for infection.  Testing a pus sample, if applicable.  How is this treated?  If your cyst does not cause symptoms, you may not need any treatment. If your cyst bothers you or is infected, you may need a procedure to drain or remove the cyst. Depending on the size, location, and severity of your cyst, your health care provider may:  Make an incision in the cyst and drain it (incision and drainage).  Open and drain the cyst, and then stitch the wound so that it stays open while it heals (marsupialization). You will be given instructions about how to care for your open wound while it heals.  Remove all or part of the cyst, and then close the wound (cyst removal).  You may need to take antibiotic medicines before your procedure.    Follow these instructions at home:  Medicines     Take over-the-counter and prescription medicines only as told by your health care provider.  If you were prescribed an antibiotic medicine, take it as told by your health care provider. Do not stop taking the antibiotic even if you start to feel better.  General instructions     Keep the area around your pilonidal cyst clean and dry.  If there is fluid or pus draining from your cyst:  Cover the area with a clean bandage (dressing) as needed.  Wash the area gently with soap and water. Pat the area dry with a clean towel. Do not rub the area because that may cause bleeding.  Remove hair from the area around the cyst only if your health care provider tells you to do this.  Do not wear tight pants or sit in one position for long periods at a time.  Perform sitz baths at home with warm water, with change to the outside dressing 2x per day and to leave internal packing in place. Do not remove internal packing, however if packing falls out, leave it alone.   Return in 2-3days for follow up.   Keep all follow-up visits as told by your health care provider. This is important.  Contact a health care provider if you have:  New redness, swelling, or pain.  A fever.  Severe pain.    Summary  A pilonidal cyst is a fluid-filled sac that forms beneath the skin near the tailbone, at the top of the crease of the buttocks (pilonidal area).  If the cyst becomes irritated or infected, it may get larger and fill with pus. An infected cyst is called an abscess.  The cause of this condition is not always known. In some cases, a hair that grows into your skin (ingrown hair) may be the cause.  If your cyst does not cause symptoms, you may not need any treatment. If your cyst bothers you or is infected, you may need a procedure to drain or remove the cyst. Please follow up with Surgery outpatient in 1-2 weeks.   Please follow up with your Primary Care Provider as well.   Return to the ED for worsening symptoms.     Our Emergency Department Referral Coordinators will be reaching out to you in the next 24-48 hours from 9:00am to 5:00pm with a follow up appointment. Please expect a phone call from the hospital in that time frame. If you do not receive a call or if you have any questions or concerns, you can reach them at   [phone]    Pilonidal Cyst    A pilonidal cyst is a fluid-filled sac that forms beneath the skin near the tailbone, at the top of the crease of the buttocks (pilonidal area). If the cyst is not large and not infected, it may not cause any problems.    If the cyst becomes irritated or infected, it may get larger and fill with pus. An infected cyst is called an abscess. A pilonidal abscess may cause pain and swelling, and it may need to be drained or removed.    What are the causes?  The cause of this condition is not always known. In some cases, a hair that grows into your skin (ingrown hair) may be the cause.    What increases the risk?  You are more likely to get a pilonidal cyst if you:  Are male.  Have lots of hair near the crease of the buttocks.  Are overweight.  Have a dimple near the crease of the buttocks.  Wear tight clothing.  Do not bathe or shower often.  Sit for long periods of time.  What are the signs or symptoms?  Signs and symptoms of a pilonidal cyst may include pain, swelling, redness, and warmth in the pilonidal area. Depending on how big the cyst is, you may be able to feel a lump near your tailbone. If your cyst becomes infected, symptoms may include:  Pus or fluid drainage.  Fever.  Pain, swelling, and redness getting worse.  The lump getting bigger.  How is this diagnosed?  This condition may be diagnosed based on:  Your symptoms and medical history.  A physical exam.  A blood test to check for infection.  Testing a pus sample, if applicable.  How is this treated?  If your cyst does not cause symptoms, you may not need any treatment. If your cyst bothers you or is infected, you may need a procedure to drain or remove the cyst. Depending on the size, location, and severity of your cyst, your health care provider may:  Make an incision in the cyst and drain it (incision and drainage).  Open and drain the cyst, and then stitch the wound so that it stays open while it heals (marsupialization). You will be given instructions about how to care for your open wound while it heals.  Remove all or part of the cyst, and then close the wound (cyst removal).  You may need to take antibiotic medicines before your procedure.    Follow these instructions at home:  Medicines     Take over-the-counter and prescription medicines only as told by your health care provider.  If you were prescribed an antibiotic medicine, take it as told by your health care provider. Do not stop taking the antibiotic even if you start to feel better.  General instructions     Keep the area around your pilonidal cyst clean and dry.  If there is fluid or pus draining from your cyst:  Cover the area with a clean bandage (dressing) as needed.  Wash the area gently with soap and water. Pat the area dry with a clean towel. Do not rub the area because that may cause bleeding.  Remove hair from the area around the cyst only if your health care provider tells you to do this.  Do not wear tight pants or sit in one position for long periods at a time.  Perform sitz baths at home with warm water, with change to the outside dressing 2x per day and to leave internal packing in place. Do not remove internal packing, however if packing falls out, leave it alone.   Return in 2-3days for follow up.   Keep all follow-up visits as told by your health care provider. This is important.  Contact a health care provider if you have:  New redness, swelling, or pain.  A fever.  Severe pain.    Summary  A pilonidal cyst is a fluid-filled sac that forms beneath the skin near the tailbone, at the top of the crease of the buttocks (pilonidal area).  If the cyst becomes irritated or infected, it may get larger and fill with pus. An infected cyst is called an abscess.  The cause of this condition is not always known. In some cases, a hair that grows into your skin (ingrown hair) may be the cause.  If your cyst does not cause symptoms, you may not need any treatment. If your cyst bothers you or is infected, you may need a procedure to drain or remove the cyst.

## 2023-04-05 NOTE — ED PROVIDER NOTE - PATIENT PORTAL LINK FT
You can access the FollowMyHealth Patient Portal offered by [facility] by registering at the following website: [email or web address]. By joining [facility]’s FollowMyHealth portal, you will also be able to view your health information using other applications (apps) compatible with our system.

## 2023-04-05 NOTE — ED PROVIDER NOTE - PROGRESS NOTE DETAILS
KA - ED referral c/s placed for outpatient surgical follow up. Will dc pt after discussion. No abx indicated at this time without worsening symptoms, worsening signs of infection, or fever.   Return instructions given and reinforced surgical outpatient follow up.

## 2023-04-05 NOTE — ED PROVIDER NOTE - CLINICAL SUMMARY MEDICAL DECISION MAKING FREE TEXT BOX
pt with post operative pilondial sinus tract with minimal drainage nontender outpt surgery follow up advised. no clinical signs of acute infection

## 2023-04-05 NOTE — ED PROVIDER NOTE - NSPTACCESSSVCSAPPTDETAILS_ED_ALL_ED_FT
Please have patient follow up with general/colorectal surgery for pilonidal cyst with sinus tract formation.

## 2023-04-05 NOTE — ED PROVIDER NOTE - ATTENDING APP SHARED VISIT CONTRIBUTION OF CARE
23 yo M presents with chronic drainage from his pilondial tract since November. Pt had surgery done by [facility] for pilondial removal. Had follow ups and md said everything was healing fine but pt reports persistent drainage and open tract. Came in today for 2nd opinion. Reports no worsening of drainage. Area is not painful to touch. No fever or chills. No other complaints. VS reviewed PE showing open post surgical sinus with minimal drainage nontender to palpation not erythematous otherwise nl exam A: pilonidal sinus P: POCUS showing possible deep abscess or post surgical changes, clinically nontender minimal drainage not infected, discussed with patient that he needs surgery follow up for  further management. Referral coordinator contacted to help facilitate rapid follow up.

## 2023-04-25 ENCOUNTER — APPOINTMENT (OUTPATIENT)
Dept: SURGERY | Facility: CLINIC | Age: 23
End: 2023-04-25
Payer: COMMERCIAL

## 2023-04-25 VITALS
BODY MASS INDEX: 25.73 KG/M2 | WEIGHT: 190 LBS | TEMPERATURE: 98 F | HEIGHT: 72 IN | HEART RATE: 100 BPM | SYSTOLIC BLOOD PRESSURE: 130 MMHG | DIASTOLIC BLOOD PRESSURE: 76 MMHG | OXYGEN SATURATION: 98 %

## 2023-04-25 PROCEDURE — 99204 OFFICE O/P NEW MOD 45 MIN: CPT

## 2023-04-28 RX ORDER — AMOXICILLIN 500 MG/1
500 CAPSULE ORAL
Qty: 15 | Refills: 0 | Status: ACTIVE | COMMUNITY
Start: 2023-02-25

## 2023-04-28 RX ORDER — OXYCODONE AND ACETAMINOPHEN 5; 325 MG/1; MG/1
5-325 TABLET ORAL
Qty: 16 | Refills: 0 | Status: ACTIVE | COMMUNITY
Start: 2022-11-09

## 2023-04-28 RX ORDER — COVID-19 MOLECULAR TEST ASSAY
KIT MISCELLANEOUS
Qty: 8 | Refills: 0 | Status: ACTIVE | COMMUNITY
Start: 2023-04-13

## 2023-04-30 NOTE — PHYSICAL EXAM
[Respiratory Effort] : normal respiratory effort [Normal Rate and Rhythm] : normal rate and rhythm [de-identified] : External examination shows a midline incision at the top of the abran cleft with\par a large pit at the inferior pole and a chronic draining sinus at the superior pole.\par This is draining a small amount of superior purulence. [de-identified] : Awake, alert, no acute distress

## 2023-04-30 NOTE — HISTORY OF PRESENT ILLNESS
[FreeTextEntry1] : Patient is a 22-year-old male with no significant past medical history, past surgical history of pilonidal cyst excision in November 2022 at an outside facility who presents for evaluation of recurrent pilonidal cyst.\par Patient reports that after having his surgery, he had wound dehiscence, which has subsequently\par developed into a chronic draining sinus. He reports daily drainage from the area. He otherwise feels well.\par He's tolerating a diet and denies recent fevers, chills, nausea, vomiting, abdominal pain, constipation, or diarrhea. Patient denies a family history of colon cancer, rectal cancer, inflammatory bowel disease.\par He has not previously had a colonoscopy.

## 2023-04-30 NOTE — ASSESSMENT
[FreeTextEntry1] : 22-year-old male with recurrent pilonidal cyst.\par \par I discussed the pathology of the pilonidal cyst at length with the patient and his mother. I recommended examination of the anorectum under anesthesia, pilonidal cyst excision and cleft lift. All risks, benefits, and alternatives were discussed with the patient, including bleeding, infection, wound dehiscence, and recurrence. They expressed understanding and were in agreement with the plan.\par

## 2023-05-19 ENCOUNTER — NON-APPOINTMENT (OUTPATIENT)
Age: 23
End: 2023-05-19

## 2023-06-01 ENCOUNTER — APPOINTMENT (OUTPATIENT)
Dept: PULMONOLOGY | Facility: CLINIC | Age: 23
End: 2023-06-01

## 2023-06-05 ENCOUNTER — OUTPATIENT (OUTPATIENT)
Dept: OUTPATIENT SERVICES | Facility: HOSPITAL | Age: 23
LOS: 1 days | End: 2023-06-05
Payer: COMMERCIAL

## 2023-06-05 VITALS
WEIGHT: 197.98 LBS | TEMPERATURE: 98 F | HEART RATE: 80 BPM | SYSTOLIC BLOOD PRESSURE: 138 MMHG | DIASTOLIC BLOOD PRESSURE: 76 MMHG | RESPIRATION RATE: 16 BRPM | HEIGHT: 72 IN | OXYGEN SATURATION: 100 %

## 2023-06-05 DIAGNOSIS — Z98.890 OTHER SPECIFIED POSTPROCEDURAL STATES: Chronic | ICD-10-CM

## 2023-06-05 DIAGNOSIS — L05.91 PILONIDAL CYST WITHOUT ABSCESS: ICD-10-CM

## 2023-06-05 DIAGNOSIS — Z01.818 ENCOUNTER FOR OTHER PREPROCEDURAL EXAMINATION: ICD-10-CM

## 2023-06-05 PROCEDURE — 99214 OFFICE O/P EST MOD 30 MIN: CPT | Mod: 25

## 2023-06-05 NOTE — H&P PST ADULT - NSICDXFAMILYHX_GEN_ALL_CORE_FT
FAMILY HISTORY:  No pertinent family history in first degree relatives     FAMILY HISTORY:  FH: asthma  FH: COPD (chronic obstructive pulmonary disease)

## 2023-06-05 NOTE — H&P PST ADULT - HISTORY OF PRESENT ILLNESS
Pt complains of _____.     Denies any chest pain, difficulty breathing, SOB, palpitations, dysuria, URI, or any other infections in the last 2 weeks. Denies any recent travel, contact, or exposure to any persons with known or suspected COVID-19. Pt also denies COVID testing within the last 2 weeks. Pt admits to receiving all doses of COVID vaccine. Denies any suicidal or homicidal ideations. Pt advised to self quarantine until day of procedure. Exercise tolerance of 2-3? flights of stairs without dyspnea. ELVIA reviewed with patient. Pt verbalized understanding of all pre-operative instructions.    Anesthesia Alert  NO--Difficult Airway  NO--History of neck surgery or radiation  NO--Limited ROM of neck  NO--History of Malignant hyperthermia  NO--No personal or family history of Pseudocholinesterase deficiency.  NO--Prior Anesthesia Complication  NO--Latex Allergy  NO--Loose teeth  NO--History of Rheumatoid Arthritis  NO--ELVIA  NO--Bleeding Risk  NO--Other_____   Pt states he has had a pilonidal cyst since 11/2022. Complains of intermittent pain rated 6/10 that comes and goes. Denies any changes in bowel movements but does complain of pain. Went for surgery previously but states it failed. Advised to proceed with above.    Denies any chest pain, difficulty breathing, SOB, palpitations, dysuria, URI, or any other infections in the last 2 weeks. Denies any recent travel, contact, or exposure to any persons with known or suspected COVID-19. Pt also denies COVID testing within the last 2 weeks. Pt admits to receiving all doses of COVID vaccine. Denies any suicidal or homicidal ideations. Pt advised to self quarantine until day of procedure. Exercise tolerance of 4-5 flights of stairs without dyspnea. ELVIA reviewed with patient. Pt verbalized understanding of all pre-operative instructions.    Anesthesia Alert  YES--Difficult Airway: Class IV  NO--History of neck surgery or radiation  NO--Limited ROM of neck  NO--History of Malignant hyperthermia  NO--No personal or family history of Pseudocholinesterase deficiency.  NO--Prior Anesthesia Complication  NO--Latex Allergy  NO--Loose teeth  NO--History of Rheumatoid Arthritis  NO--ELVIA  NO--Bleeding Risk  NO--Other_____

## 2023-06-05 NOTE — H&P PST ADULT - NSANTHOSAYNRD_GEN_A_CORE
No. ELVIA screening performed.  STOP BANG Legend: 0-2 = LOW Risk; 3-4 = INTERMEDIATE Risk; 5-8 = HIGH Risk

## 2023-06-05 NOTE — H&P PST ADULT - REASON FOR ADMISSION
23 yo male presents for PAST in preparation for pilonidal cyst excision and cleft lift on 6/26/2023 under general anesthesia by Dr. Nash ([facility]).

## 2023-06-05 NOTE — H&P PST ADULT - NSICDXPASTSURGICALHX_GEN_ALL_CORE_FT
PAST SURGICAL HISTORY:  History of excision of pilonidal cyst      PAST SURGICAL HISTORY:  History of ankle surgery     History of excision of pilonidal cyst

## 2023-06-06 DIAGNOSIS — L05.91 PILONIDAL CYST WITHOUT ABSCESS: ICD-10-CM

## 2023-06-06 DIAGNOSIS — Z01.818 ENCOUNTER FOR OTHER PREPROCEDURAL EXAMINATION: ICD-10-CM

## 2023-06-23 NOTE — ASU PATIENT PROFILE, ADULT - TEACHING/LEARNING DEVELOPMENTAL CONSIDERATIONS
Detail Level: Detailed Add 87938 Cpt? (Important Note: In 2017 The Use Of 39112 Is Being Tracked By Cms To Determine Future Global Period Reimbursement For Global Periods): no none

## 2023-06-23 NOTE — ASU PATIENT PROFILE, ADULT - FALL HARM RISK - UNIVERSAL INTERVENTIONS
Bed in lowest position, wheels locked, appropriate side rails in place/Call bell, personal items and telephone in reach/Instruct patient to call for assistance before getting out of bed or chair/Non-slip footwear when patient is out of bed/[place] to call system/Physically safe environment - no spills, clutter or unnecessary equipment/Purposeful Proactive Rounding/Room/bathroom lighting operational, light cord in reach

## 2023-06-26 ENCOUNTER — TRANSCRIPTION ENCOUNTER (OUTPATIENT)
Age: 23
End: 2023-06-26

## 2023-06-26 ENCOUNTER — RESULT REVIEW (OUTPATIENT)
Age: 23
End: 2023-06-26

## 2023-06-26 ENCOUNTER — OUTPATIENT (OUTPATIENT)
Dept: INPATIENT UNIT | Facility: HOSPITAL | Age: 23
LOS: 1 days | Discharge: HOME | End: 2023-06-26
Payer: COMMERCIAL

## 2023-06-26 ENCOUNTER — APPOINTMENT (OUTPATIENT)
Dept: SURGERY | Facility: AMBULATORY SURGERY CENTER | Age: 23
End: 2023-06-26

## 2023-06-26 VITALS
OXYGEN SATURATION: 100 % | SYSTOLIC BLOOD PRESSURE: 116 MMHG | RESPIRATION RATE: 20 BRPM | HEART RATE: 74 BPM | WEIGHT: 197.98 LBS | HEIGHT: 72 IN | TEMPERATURE: 99 F | DIASTOLIC BLOOD PRESSURE: 76 MMHG

## 2023-06-26 VITALS
OXYGEN SATURATION: 97 % | RESPIRATION RATE: 16 BRPM | SYSTOLIC BLOOD PRESSURE: 124 MMHG | DIASTOLIC BLOOD PRESSURE: 64 MMHG | HEART RATE: 86 BPM | TEMPERATURE: 98 F

## 2023-06-26 DIAGNOSIS — L05.91 PILONIDAL CYST WITHOUT ABSCESS: ICD-10-CM

## 2023-06-26 DIAGNOSIS — Z98.890 OTHER SPECIFIED POSTPROCEDURAL STATES: Chronic | ICD-10-CM

## 2023-06-26 PROCEDURE — 88304 TISSUE EXAM BY PATHOLOGIST: CPT | Mod: 26

## 2023-06-26 PROCEDURE — C1889: CPT

## 2023-06-26 PROCEDURE — 88304 TISSUE EXAM BY PATHOLOGIST: CPT

## 2023-06-26 PROCEDURE — 11772 EXC PILONIDAL CYST COMP: CPT

## 2023-06-26 RX ORDER — CETIRIZINE HYDROCHLORIDE 10 MG/1
1 TABLET ORAL
Refills: 0 | DISCHARGE

## 2023-06-26 RX ORDER — KETOROLAC TROMETHAMINE 30 MG/ML
30 SYRINGE (ML) INJECTION ONCE
Refills: 0 | Status: DISCONTINUED | OUTPATIENT
Start: 2023-06-26 | End: 2023-06-26

## 2023-06-26 RX ORDER — OXYCODONE AND ACETAMINOPHEN 5; 325 MG/1; MG/1
1 TABLET ORAL EVERY 4 HOURS
Refills: 0 | Status: DISCONTINUED | OUTPATIENT
Start: 2023-06-26 | End: 2023-06-26

## 2023-06-26 RX ORDER — SODIUM CHLORIDE 9 MG/ML
1000 INJECTION, SOLUTION INTRAVENOUS
Refills: 0 | Status: DISCONTINUED | OUTPATIENT
Start: 2023-06-26 | End: 2023-06-26

## 2023-06-26 RX ORDER — ONDANSETRON 8 MG/1
4 TABLET, FILM COATED ORAL ONCE
Refills: 0 | Status: DISCONTINUED | OUTPATIENT
Start: 2023-06-26 | End: 2023-06-26

## 2023-06-26 RX ORDER — OXYCODONE 5 MG/1
5 TABLET ORAL
Qty: 10 | Refills: 0 | Status: ACTIVE | COMMUNITY
Start: 2023-06-26 | End: 1900-01-01

## 2023-06-26 RX ORDER — MORPHINE SULFATE 50 MG/1
2 CAPSULE, EXTENDED RELEASE ORAL
Refills: 0 | Status: DISCONTINUED | OUTPATIENT
Start: 2023-06-26 | End: 2023-06-26

## 2023-06-26 RX ADMIN — SODIUM CHLORIDE 100 MILLILITER(S): 9 INJECTION, SOLUTION INTRAVENOUS at 13:00

## 2023-06-26 NOTE — ASU DISCHARGE PLAN (ADULT/PEDIATRIC) - CARE PROVIDER_API CALL
Familia Nash  Colon/Rectal Surgery  [address] [address]  Phone: [phone]  Fax: [phone]  Follow Up Time:

## 2023-06-26 NOTE — BRIEF OPERATIVE NOTE - OPERATION/FINDINGS
Patient with pilonidal cyst. Excised using Max cleft lift technique. No complications. Wound closed in many layers. CHINO drain left in place.

## 2023-06-26 NOTE — ASU PREOP CHECKLIST - BMI (KG/M2)
ER Documentation


Chief Complaint


Date/Time


DATE: 3/1/17 


TIME: 00:16


Chief Complaint


LEFT UPPER BACK PAIN X 1 WEEK WITH COUGH AND CHEST WALL PAIN WITH INSPIRATI





HPI


This is an 83-year-old female who comes in with left upper back pain with 1 

week with cough.  Patient is also been noted to be wheezing.  No nausea no 

vomiting no fevers no chills.  No other current complaints.  Pain is mild in 

intensity.  No exacerbating or remitting factors.  No other current issues





ROS


All systems reviewed and are negative except as per history of present illness.





Medications


Home Meds


Active Scripts


Azithromycin* (Zithromax*) 250 Mg Tablet, 250 MG PO .ZPACK AS DIRECTED, #6 TAB


   TAKE 500 MG (2 TABS) THE FIRST DAY THEN 250 MG (1 TAB) DAYS 2-5


   Prov:ALESSIA GARCIA         3/1/17


Albuterol Sulfate* (Ventolin HFA*) 18 Gm Hfa.aer.ad, 2 PUFF INHALATION Q4H, #1 

INHALER


   Prov:ALESSIA GARCIA         3/1/17


Prednisone* (Prednisone*) 20 Mg Tab, 40 MG PO DAILY for 4 Days, TAB


   Prov:ALESSIA GARCIA.         3/1/17


Discontinued Scripts


Dextromethorphan Hb-Promethazine Hcl (Promethazine DM Syrup) 180 Ml Syrup, 5 ML 

PO Q6H Y for COUGH, #4 OZ


   Prov:HU CROSS DO         3/12/16


Prednisone* (Prednisone*) 20 Mg Tab, 40 MG PO DAILY for 4 Days, TAB


   Prov:HU CROSS DO         3/12/16





Allergies


Allergies:  


Coded Allergies:  


     No Known Allergy (Unverified , 2/28/17)





PMhx/Soc


Medical and Surgical Hx:  pt denies Medical Hx, pt denies Surgical Hx


History of Surgery:  No


Anesthesia Reaction:  No


Hx Neurological Disorder:  No


Hx Respiratory Disorders:  No


Hx Cardiac Disorders:  No


Hx Psychiatric Problems:  No


Hx Miscellaneous Medical Probl:  No


Hx Alcohol Use:  No


Hx Substance Use:  No


Hx Tobacco Use:  No


Smoking Status:  Never smoker





Physical Exam


Vitals





Vital Signs








  Date Time  Temp Pulse Resp B/P Pulse Ox O2 Delivery O2 Flow Rate FiO2


 


3/1/17 00:03  81 16 149/73 98 Room Air  


 


2/28/17 23:33  68 20  97   21


 


2/28/17 19:33 98.9 95 24 123/70 95   








Physical Exam


Const:  []


Head:   Atraumatic 


Eyes:    Normal Conjunctiva


ENT:    Normal External Ears, Nose and Mouth.


Neck:               Full range of motion..~ No meningismus.


Resp:    Clear to auscultation bilaterally


Cardio:    Regular rate and rhythm, no murmurs


Abd:    Soft, non tender, non distended. Normal bowel sounds


Skin:    No petechiae or rashes


Back:    No midline or flank tenderness


Ext:    No cyanosis, or edema


Neur:    Awake and alert


Psych:    Normal Mood and Affect


Result Diagram:  


2/28/17 2200 2/28/17 2200





Results 24 hrs





 Laboratory Tests








Test


  2/28/17


22:00 2/28/17


22:15


 


Alanine Aminotransferase


(ALT/SGPT) 24IU/L 


  


 


 


Albumin 4.1g/dl  


 


Albumin/Globulin Ratio 0.95  


 


Alkaline Phosphatase 119IU/L  


 


Anion Gap 16  


 


Aspartate Amino Transf


(AST/SGOT) 32IU/L 


  


 


 


Basophils # 0.110^3/ul  


 


Basophils % 0.7%  


 


Blood Urea Nitrogen 13mg/dl  


 


Calcium Level 9.5mg/dl  


 


Carbon Dioxide Level 29mmol/L  


 


Chloride Level 103mmol/L  


 


Creatinine 0.62mg/dl  


 


Direct Bilirubin 0.00mg/dl  


 


Eosinophils # 0.210^3/ul  


 


Eosinophils % 1.8%  


 


Globulin 4.30g/dl  


 


Glucose Level 92mg/dl  


 


Hematocrit 40.8%  


 


Hemoglobin 13.6g/dl  


 


Indirect Bilirubin 0.1mg/dl  


 


Lymphocytes # 2.510^3/ul  


 


Lymphocytes % 28.7%  


 


Mean Corpuscular Hemoglobin 30.8pg  


 


Mean Corpuscular Hemoglobin


Concent 33.3g/dl 


  


 


 


Mean Corpuscular Volume 92.5fl  


 


Mean Platelet Volume 10.4fl  


 


Monocytes # 0.810^3/ul  


 


Monocytes % 8.5%  


 


Neutrophils # 5.310^3/ul  


 


Neutrophils % 59.8%  


 


Nucleated Red Blood Cells # 0.010^3/ul  


 


Nucleated Red Blood Cells % 0.0/100WBC  


 


Platelet Count 08784^3/UL  


 


Potassium Level 4.1mmol/L  


 


Red Blood Count 4.4110^6/ul  


 


Red Cell Distribution Width 12.5%  


 


Sodium Level 144mmol/L  


 


Total Bilirubin 0.1mg/dl  


 


Total Protein 8.4g/dl  


 


White Blood Count 8.810^3/ul  


 


Urine Bilirubin  NEGATIVE 


 


Urine Clarity  CLEAR 


 


Urine Color  LT. YELLOW 


 


Urine Glucose  NEGATIVE% 


 


Urine Hemoglobin  NEGATIVE 


 


Urine Ketones  NEGATIVE 


 


Urine Leukocyte Esterase  TRACE 


 


Urine Microscopic RBC  0-2/HPF 


 


Urine Microscopic WBC  0-2/HPF 


 


Urine Nitrite  NEGATIVE 


 


Urine Specific Gravity  <=1.005 


 


Urine Squamous Epithelial


Cells 


  FEW 


 


 


Urine Total Protein  NEGATIVE 


 


Urine Urobilinogen  0.2  E.U./dL 


 


Urine pH  5.5 








 Current Medications








 Medications


  (Trade)  Dose


 Ordered  Sig/Jassi


 Route


 PRN Reason  Start Time


 Stop Time Status Last Admin


Dose Admin


 


 Albuterol


  (Proventil 0.5%


  (Neb))  5 mg  ONCE  ONCE


 NEB


   2/28/17 23:07


 2/28/17 23:08 DC 2/28/17 23:33


 


 


 Ipratropium


 Bromide


  (Atrovent 0.02%


  (Neb))  0.5 mg  ONCE  ONCE


 NEB


   2/28/17 23:08


 2/28/17 23:09 DC 2/28/17 23:33


 


 


 Methylprednisolone


 Sodium Succinate


  (Solu-Medrol)  125 mg  ONCE  ONCE


 IV


   2/28/17 23:08


 2/28/17 23:09 DC 2/28/17 23:13


 


 


 Ketorolac


 Tromethamine


  (Toradol)  15 mg  ONCE  STAT


 IV


   2/28/17 23:49


 2/28/17 23:50 DC 2/28/17 23:54


 











Procedures/MDM


Chest X-ray 1V Interpreted by me:


Soft Tissue:                                               No acute 

abnormalities


Bones:                                                    No acute abnormalities


Mediastinum/Cardiac Silhouette/Lungs:     [No acute abnormalities]





Patient's respiratory status has stabilized while in the department and is 

appropriate for outpatient work up.


Exam and work up not consistent w/ impending respiratory failure or 

cardiovascular collapse.





Departure


Diagnosis:  


 Primary Impression:  


 Asthmatic bronchitis with acute exacerbation


Condition:  Stable


Patient Instructions:  Bronchitis With Wheezing (Adult)











ALESSIA GARCIA Mar 1, 2017 00:17 26.8

## 2023-06-26 NOTE — CHART NOTE - NSCHARTNOTEFT_GEN_A_CORE
Detail Level: Detailed PACU ANESTHESIA ADMISSION NOTE      Procedure:   Post op diagnosis:      ____  Intubated  TV:______       Rate: ______      FiO2: ______    __x__  Patent Airway    ____  Full return of protective reflexes    ____  Full recovery from anesthesia / back to baseline     Vitals:   T:      98     R:   12               BP:     137/77              Sat: 100%                  P: 110      Mental Status:  __x__ Awake   _____ Alert   _____ Drowsy   _____ Sedated    Nausea/Vomiting:  __x__ NO  ______Yes,   See Post - Op Orders          Pain Scale (0-10):  _____    Treatment: ____ None    ___x_ See Post - Op/PCA Orders    Post - Operative Fluids:   ____ Oral   ___x_ See Post - Op Orders    Plan: Discharge:   ____Home       ___x__Floor     _____Critical Care    _____  Other:_________________    Comments: Uneventful intraoperative course. No anesthesia issues or complications noted.  Patient stable upon arrival to PACU. Report given to RN. Discharge when criteria met.

## 2023-06-26 NOTE — ASU DISCHARGE PLAN (ADULT/PEDIATRIC) - ASU DC SPECIAL INSTRUCTIONSFT
Follow Up with Dr. Nash in 1-2 weeks. Please call office for confirmation of your appointment.    Diet: Regular    Pain: You can take over the counter medications such as Tylenol, and Ibuprofen for pain control. Please adhere to the instructions on the back of the bottle. Take these around the clock for best results. Will also send with prescription pain medication to use only for severe pain.    Antibiotics: None    If you develop fevers, chills, worsening pain, increased drainage from the wound, foul smelling drainage from the wound, nausea that won't subside, vomiting, or any other symptoms of concern, please call MD for further advice, evaluation, and/or treatment.    Activity: Ambulate and get out of bed as tolerated, and with assistance if feeling weak.    CHINO drain needs to be emptied as needed. Follow Up with Dr. Nash in 1-2 weeks. Please call office for confirmation of your appointment.    Diet: Regular    Pain: You can take over the counter medications such as Tylenol, and Ibuprofen for pain control. Please adhere to the instructions on the back of the bottle. Take these around the clock for best results. Will also send with prescription pain medication to use only for severe pain.    Antibiotics: None    If you develop fevers, chills, worsening pain, increased drainage from the wound, foul smelling drainage from the wound, nausea that won't subside, vomiting, or any other symptoms of concern, please call MD for further advice, evaluation, and/or treatment.    Activity: Ambulate and get out of bed as tolerated, and with assistance if feeling weak.    CHINO drain needs to be emptied as needed.    It is important not to lay directly on the wound. Use pillows to lean off to one side and offload the wound.

## 2023-06-27 LAB — SURGICAL PATHOLOGY STUDY: SIGNIFICANT CHANGE UP

## 2023-06-28 DIAGNOSIS — L05.91 PILONIDAL CYST WITHOUT ABSCESS: ICD-10-CM

## 2023-07-03 ENCOUNTER — APPOINTMENT (OUTPATIENT)
Dept: SURGERY | Facility: CLINIC | Age: 23
End: 2023-07-03
Payer: COMMERCIAL

## 2023-07-03 VITALS
HEIGHT: 72 IN | TEMPERATURE: 95.1 F | OXYGEN SATURATION: 92 % | WEIGHT: 158 LBS | BODY MASS INDEX: 21.4 KG/M2 | HEART RATE: 89 BPM

## 2023-07-03 PROCEDURE — 99024 POSTOP FOLLOW-UP VISIT: CPT

## 2023-07-07 NOTE — ADDENDUM
[FreeTextEntry1] : I, Tootie Castillo ([facility]) assisted in filling out this chart under the dictation of Dr. Familia Nash on 07/04/2023.\par

## 2023-07-07 NOTE — HISTORY OF PRESENT ILLNESS
[FreeTextEntry1] : Patient is a 22-year-old male with no significant past medical history, past surgical history of pilonidal cyst excision in November 2022 at an outside facility who presents for post-operative visits, s/p pilonidal cyst excision and cleft lift on 6-. Pathology was consistent with pilonidal cyst. Since his last visit, he reports pain at the drain site and swelling at the incision. He otherwise feels well. He's tolerating a diet and denies recent fevers, chills, nausea, vomiting, abdominal pain, constipation, or diarrhea. Patient denies a family history of colon cancer, rectal cancer, inflammatory bowel disease. He has not previously had a colonoscopy. \par  \par \par

## 2023-07-07 NOTE — PHYSICAL EXAM
[FreeTextEntry1] : General: Awake, Alert, No Acte Distress \par Respiratory: Normal Respiratory Effort\par Cardiovascular: Normal Rate and Rhythm\par Rectal: External examination shows a healing pilonidal cyst incision with Dermabond in place. No erythema, induration or purulence. Left-sided drain removed, serosanguineous drainage in the drain. No evidence of purulence. Small left-sided hematoma underneath the incision noted.\par

## 2023-07-07 NOTE — ASSESSMENT
[FreeTextEntry1] : 22-year-old male s/p pilonidal cyst excision and cleft lift with left-sided hematoma.\par \par I discussed the condition with the patient and his father. I recommend that he continue to avoid sitting and laying on the area for at least one more week. We discussed he has a hematoma in that area, which will increase the time it takes for this to heal. He will contact our office for any changes. We'll see him back in three weeks.\par

## 2023-07-26 ENCOUNTER — APPOINTMENT (OUTPATIENT)
Dept: SURGERY | Facility: CLINIC | Age: 23
End: 2023-07-26
Payer: COMMERCIAL

## 2023-07-26 VITALS
HEART RATE: 88 BPM | TEMPERATURE: 97.6 F | SYSTOLIC BLOOD PRESSURE: 100 MMHG | BODY MASS INDEX: 21.4 KG/M2 | DIASTOLIC BLOOD PRESSURE: 62 MMHG | WEIGHT: 158 LBS | OXYGEN SATURATION: 99 % | HEIGHT: 72 IN

## 2023-07-26 PROCEDURE — 99024 POSTOP FOLLOW-UP VISIT: CPT

## 2023-08-08 NOTE — HISTORY OF PRESENT ILLNESS
[FreeTextEntry1] : Patient is a 22-year-old male with no significant past medical history, past surgical history of pilonidal cyst excision in November 2022 at an outside facility who presents for post-operative visits, s/p pilonidal cyst excision and cleft lift on 6-. Pathology was consistent with pilonidal cyst. Since the patient's last office visit, he denies any pain, swelling, or drainage at the incision. He's tolerating a diet and denies recent fevers, chills, nausea, vomiting, abdominal pain, constipation, or diarrhea. Patient denies a family history of colon cancer, rectal cancer, inflammatory bowel disease. He has not previously had a colonoscopy. \par \par \par

## 2023-08-08 NOTE — PHYSICAL EXAM
[FreeTextEntry1] : General: Awake, Alert, No Acute Distress\par Respiratory: Normal Respiratory Effort\par Cardiovascular: Normal Rate and Rhythm\par Rectal: External examination shows a healed pilonidal cyst incision. No evidence of erythema, induration, or purulence. Left-sided hematoma has resolved.\par

## 2023-08-08 NOTE — ADDENDUM
[FreeTextEntry1] : I, Tootie Castillo ([facility]) assisted in filling out this chart under the dictation of Dr. Familia Nash on 07/26/2023.\par

## 2023-08-08 NOTE — ASSESSMENT
[FreeTextEntry1] : 22-year-old male, s/p pilonidal cyst incision and cleft lift.  The patient's wound has healed well. I recommended that he slowly return to his regular physical activity. We'll see him in three months.

## 2023-10-24 ENCOUNTER — APPOINTMENT (OUTPATIENT)
Dept: SURGERY | Facility: CLINIC | Age: 23
End: 2023-10-24
Payer: COMMERCIAL

## 2023-10-24 VITALS
HEART RATE: 98 BPM | SYSTOLIC BLOOD PRESSURE: 124 MMHG | BODY MASS INDEX: 21.4 KG/M2 | DIASTOLIC BLOOD PRESSURE: 76 MMHG | OXYGEN SATURATION: 97 % | HEIGHT: 72 IN | WEIGHT: 158 LBS | TEMPERATURE: 97 F

## 2023-10-24 DIAGNOSIS — L05.91 PILONIDAL CYST W/OUT ABSCESS: ICD-10-CM

## 2023-10-24 PROCEDURE — 99213 OFFICE O/P EST LOW 20 MIN: CPT

## 2024-05-01 ENCOUNTER — EMERGENCY (EMERGENCY)
Facility: HOSPITAL | Age: 24
LOS: 0 days | Discharge: HOME | End: 2024-05-01
Attending: STUDENT IN AN ORGANIZED HEALTH CARE EDUCATION/TRAINING PROGRAM
Payer: COMMERCIAL

## 2024-05-01 VITALS
OXYGEN SATURATION: 100 % | HEART RATE: 67 BPM | DIASTOLIC BLOOD PRESSURE: 87 MMHG | TEMPERATURE: 98 F | HEIGHT: 72 IN | RESPIRATION RATE: 18 BRPM | SYSTOLIC BLOOD PRESSURE: 131 MMHG | WEIGHT: 210.1 LBS

## 2024-05-01 DIAGNOSIS — Z98.890 OTHER SPECIFIED POSTPROCEDURAL STATES: Chronic | ICD-10-CM

## 2024-05-01 DIAGNOSIS — Z88.1 ALLERGY STATUS TO OTHER ANTIBIOTIC AGENTS STATUS: ICD-10-CM

## 2024-05-01 DIAGNOSIS — R19.7 DIARRHEA, UNSPECIFIED: ICD-10-CM

## 2024-05-01 DIAGNOSIS — R63.0 ANOREXIA: ICD-10-CM

## 2024-05-01 DIAGNOSIS — F17.200 NICOTINE DEPENDENCE, UNSPECIFIED, UNCOMPLICATED: ICD-10-CM

## 2024-05-01 DIAGNOSIS — R11.2 NAUSEA WITH VOMITING, UNSPECIFIED: ICD-10-CM

## 2024-05-01 DIAGNOSIS — E86.0 DEHYDRATION: ICD-10-CM

## 2024-05-01 LAB
ALBUMIN SERPL ELPH-MCNC: 5 G/DL — SIGNIFICANT CHANGE UP (ref 3.5–5.2)
ALP SERPL-CCNC: 97 U/L — SIGNIFICANT CHANGE UP (ref 30–115)
ALT FLD-CCNC: 40 U/L — SIGNIFICANT CHANGE UP (ref 0–41)
ANION GAP SERPL CALC-SCNC: 18 MMOL/L — HIGH (ref 7–14)
APPEARANCE UR: ABNORMAL
AST SERPL-CCNC: 42 U/L — HIGH (ref 0–41)
BACTERIA # UR AUTO: NEGATIVE /HPF — SIGNIFICANT CHANGE UP
BASOPHILS # BLD AUTO: 0.06 K/UL — SIGNIFICANT CHANGE UP (ref 0–0.2)
BASOPHILS NFR BLD AUTO: 0.5 % — SIGNIFICANT CHANGE UP (ref 0–1)
BILIRUB DIRECT SERPL-MCNC: 0.2 MG/DL — SIGNIFICANT CHANGE UP (ref 0–0.3)
BILIRUB INDIRECT FLD-MCNC: 0.6 MG/DL — SIGNIFICANT CHANGE UP (ref 0.2–1.2)
BILIRUB SERPL-MCNC: 0.8 MG/DL — SIGNIFICANT CHANGE UP (ref 0.2–1.2)
BILIRUB UR-MCNC: NEGATIVE — SIGNIFICANT CHANGE UP
BUN SERPL-MCNC: 9 MG/DL — LOW (ref 10–20)
CALCIUM SERPL-MCNC: 9.9 MG/DL — SIGNIFICANT CHANGE UP (ref 8.4–10.5)
CAST: 0 /LPF — SIGNIFICANT CHANGE UP (ref 0–4)
CHLORIDE SERPL-SCNC: 103 MMOL/L — SIGNIFICANT CHANGE UP (ref 98–110)
CO2 SERPL-SCNC: 20 MMOL/L — SIGNIFICANT CHANGE UP (ref 17–32)
COLOR SPEC: YELLOW — SIGNIFICANT CHANGE UP
CREAT SERPL-MCNC: 0.9 MG/DL — SIGNIFICANT CHANGE UP (ref 0.7–1.5)
DIFF PNL FLD: ABNORMAL
EGFR: 125 ML/MIN/1.73M2 — SIGNIFICANT CHANGE UP
EOSINOPHIL # BLD AUTO: 0.01 K/UL — SIGNIFICANT CHANGE UP (ref 0–0.7)
EOSINOPHIL NFR BLD AUTO: 0.1 % — SIGNIFICANT CHANGE UP (ref 0–8)
GLUCOSE SERPL-MCNC: 110 MG/DL — HIGH (ref 70–99)
GLUCOSE UR QL: NEGATIVE MG/DL — SIGNIFICANT CHANGE UP
HCT VFR BLD CALC: 50.3 % — SIGNIFICANT CHANGE UP (ref 42–52)
HCV AB S/CO SERPL IA: 0.06 COI — SIGNIFICANT CHANGE UP
HCV AB SERPL-IMP: SIGNIFICANT CHANGE UP
HGB BLD-MCNC: 17.1 G/DL — SIGNIFICANT CHANGE UP (ref 14–18)
IMM GRANULOCYTES NFR BLD AUTO: 1 % — HIGH (ref 0.1–0.3)
KETONES UR-MCNC: 80 MG/DL
LACTATE SERPL-SCNC: 1.5 MMOL/L — SIGNIFICANT CHANGE UP (ref 0.7–2)
LEUKOCYTE ESTERASE UR-ACNC: ABNORMAL
LIDOCAIN IGE QN: 16 U/L — SIGNIFICANT CHANGE UP (ref 7–60)
LYMPHOCYTES # BLD AUTO: 0.95 K/UL — LOW (ref 1.2–3.4)
LYMPHOCYTES # BLD AUTO: 7.7 % — LOW (ref 20.5–51.1)
MAGNESIUM SERPL-MCNC: 2.1 MG/DL — SIGNIFICANT CHANGE UP (ref 1.8–2.4)
MCHC RBC-ENTMCNC: 29.6 PG — SIGNIFICANT CHANGE UP (ref 27–31)
MCHC RBC-ENTMCNC: 34 G/DL — SIGNIFICANT CHANGE UP (ref 32–37)
MCV RBC AUTO: 87 FL — SIGNIFICANT CHANGE UP (ref 80–94)
MONOCYTES # BLD AUTO: 0.55 K/UL — SIGNIFICANT CHANGE UP (ref 0.1–0.6)
MONOCYTES NFR BLD AUTO: 4.4 % — SIGNIFICANT CHANGE UP (ref 1.7–9.3)
NEUTROPHILS # BLD AUTO: 10.69 K/UL — HIGH (ref 1.4–6.5)
NEUTROPHILS NFR BLD AUTO: 86.3 % — HIGH (ref 42.2–75.2)
NITRITE UR-MCNC: NEGATIVE — SIGNIFICANT CHANGE UP
NRBC # BLD: 0 /100 WBCS — SIGNIFICANT CHANGE UP (ref 0–0)
PH UR: 7.5 — SIGNIFICANT CHANGE UP (ref 5–8)
PLATELET # BLD AUTO: 268 K/UL — SIGNIFICANT CHANGE UP (ref 130–400)
PMV BLD: 10.1 FL — SIGNIFICANT CHANGE UP (ref 7.4–10.4)
POTASSIUM SERPL-MCNC: 4.6 MMOL/L — SIGNIFICANT CHANGE UP (ref 3.5–5)
POTASSIUM SERPL-SCNC: 4.6 MMOL/L — SIGNIFICANT CHANGE UP (ref 3.5–5)
PROT SERPL-MCNC: 8 G/DL — SIGNIFICANT CHANGE UP (ref 6–8)
PROT UR-MCNC: 30 MG/DL
RBC # BLD: 5.78 M/UL — SIGNIFICANT CHANGE UP (ref 4.7–6.1)
RBC # FLD: 12.3 % — SIGNIFICANT CHANGE UP (ref 11.5–14.5)
RBC CASTS # UR COMP ASSIST: 1 /HPF — SIGNIFICANT CHANGE UP (ref 0–4)
SODIUM SERPL-SCNC: 141 MMOL/L — SIGNIFICANT CHANGE UP (ref 135–146)
SP GR SPEC: 1.02 — SIGNIFICANT CHANGE UP (ref 1–1.03)
SQUAMOUS # UR AUTO: 1 /HPF — SIGNIFICANT CHANGE UP (ref 0–5)
UROBILINOGEN FLD QL: 0.2 MG/DL — SIGNIFICANT CHANGE UP (ref 0.2–1)
WBC # BLD: 12.38 K/UL — HIGH (ref 4.8–10.8)
WBC # FLD AUTO: 12.38 K/UL — HIGH (ref 4.8–10.8)
WBC UR QL: 5 /HPF — SIGNIFICANT CHANGE UP (ref 0–5)

## 2024-05-01 PROCEDURE — 96376 TX/PRO/DX INJ SAME DRUG ADON: CPT

## 2024-05-01 PROCEDURE — 83605 ASSAY OF LACTIC ACID: CPT

## 2024-05-01 PROCEDURE — 81001 URINALYSIS AUTO W/SCOPE: CPT

## 2024-05-01 PROCEDURE — 85025 COMPLETE CBC W/AUTO DIFF WBC: CPT

## 2024-05-01 PROCEDURE — 83735 ASSAY OF MAGNESIUM: CPT

## 2024-05-01 PROCEDURE — 74177 CT ABD & PELVIS W/CONTRAST: CPT | Mod: 26,MC

## 2024-05-01 PROCEDURE — 80048 BASIC METABOLIC PNL TOTAL CA: CPT

## 2024-05-01 PROCEDURE — 83690 ASSAY OF LIPASE: CPT

## 2024-05-01 PROCEDURE — 86803 HEPATITIS C AB TEST: CPT

## 2024-05-01 PROCEDURE — 96375 TX/PRO/DX INJ NEW DRUG ADDON: CPT

## 2024-05-01 PROCEDURE — 80076 HEPATIC FUNCTION PANEL: CPT

## 2024-05-01 PROCEDURE — 99284 EMERGENCY DEPT VISIT MOD MDM: CPT | Mod: 25

## 2024-05-01 PROCEDURE — 74177 CT ABD & PELVIS W/CONTRAST: CPT | Mod: MC

## 2024-05-01 PROCEDURE — 96374 THER/PROPH/DIAG INJ IV PUSH: CPT | Mod: XU

## 2024-05-01 PROCEDURE — 36415 COLL VENOUS BLD VENIPUNCTURE: CPT

## 2024-05-01 PROCEDURE — 99285 EMERGENCY DEPT VISIT HI MDM: CPT

## 2024-05-01 RX ORDER — SODIUM CHLORIDE 9 MG/ML
1000 INJECTION INTRAMUSCULAR; INTRAVENOUS; SUBCUTANEOUS ONCE
Refills: 0 | Status: COMPLETED | OUTPATIENT
Start: 2024-05-01 | End: 2024-05-01

## 2024-05-01 RX ORDER — ONDANSETRON 8 MG/1
1 TABLET, FILM COATED ORAL
Qty: 2 | Refills: 0
Start: 2024-05-01 | End: 2024-05-07

## 2024-05-01 RX ORDER — ONDANSETRON 8 MG/1
4 TABLET, FILM COATED ORAL ONCE
Refills: 0 | Status: COMPLETED | OUTPATIENT
Start: 2024-05-01 | End: 2024-05-01

## 2024-05-01 RX ORDER — FAMOTIDINE 10 MG/ML
20 INJECTION INTRAVENOUS ONCE
Refills: 0 | Status: COMPLETED | OUTPATIENT
Start: 2024-05-01 | End: 2024-05-01

## 2024-05-01 RX ADMIN — ONDANSETRON 4 MILLIGRAM(S): 8 TABLET, FILM COATED ORAL at 18:22

## 2024-05-01 RX ADMIN — FAMOTIDINE 20 MILLIGRAM(S): 10 INJECTION INTRAVENOUS at 13:47

## 2024-05-01 RX ADMIN — SODIUM CHLORIDE 1000 MILLILITER(S): 9 INJECTION INTRAMUSCULAR; INTRAVENOUS; SUBCUTANEOUS at 13:45

## 2024-05-01 RX ADMIN — SODIUM CHLORIDE 1000 MILLILITER(S): 9 INJECTION INTRAMUSCULAR; INTRAVENOUS; SUBCUTANEOUS at 18:22

## 2024-05-01 RX ADMIN — ONDANSETRON 4 MILLIGRAM(S): 8 TABLET, FILM COATED ORAL at 13:50

## 2024-05-01 RX ADMIN — SODIUM CHLORIDE 1000 MILLILITER(S): 9 INJECTION INTRAMUSCULAR; INTRAVENOUS; SUBCUTANEOUS at 15:17

## 2024-05-01 NOTE — ED PROVIDER NOTE - OBJECTIVE STATEMENT
23-year-old male no past medical history presents to the ED complaining of multiple episodes of nausea vomiting, diarrhea and decreased appetite since 7 AM.  Patient states feels generalized weakness.  Patient states smokes marijuana daily.  Patient denies chest pain, shortness of breath fever or chills.

## 2024-05-01 NOTE — ED PROVIDER NOTE - ATTENDING APP SHARED VISIT CONTRIBUTION OF CARE
23-year-old with no medical problems who presents with nausea vomiting diarrhea.  Patient states that since yesterday he has been having nonbloody nonbilious vomiting and diarrhea.  Patient denies any abdominal pain fevers chills chest pain shortness of breath or any other medical complaints.    VITAL SIGNS: I have reviewed nursing notes and confirm.  CONSTITUTIONAL: non-toxic, well appearing  SKIN: no rash, no petechiae.  EYES:  EOMI, pink conjunctiva, anicteric  ENT: tongue midline, no exudates, MMM  NECK: Supple; no meningismus, no JVD  CARD: RRR, no murmurs, equal radial pulses bilaterally 2+  RESP: CTAB, no respiratory distress  ABD: Soft, non-tender, non-distended, no peritoneal signs, no HSM, no CVA tenderness        23-year-old male that presents with nonbilious nonbloody vomiting diarrhea.  Will obtain labs IV fluids pain management reassess dispo pending.

## 2024-05-01 NOTE — ED PROVIDER NOTE - PATIENT PORTAL LINK FT
You can access the FollowMyHealth Patient Portal offered by [facility] by registering at the following website: [email or web address]. By joining [facility]’s FollowMyHealth portal, you will also be able to view your health information using other applications (apps) compatible with our system.

## 2024-05-01 NOTE — ED ADULT TRIAGE NOTE - HEIGHT IN INCHES
APPTS ARE READY TO BE MADE: [X] YES    Best Family or Patient Contact (if needed):    Additional Information about above appointments (if needed):    1:   2:   3:     Other comments or requests:   
0

## 2024-05-01 NOTE — ED ADULT NURSE NOTE - SUICIDE SCREENING QUESTION 3
HOME: [phone]   WORK: N/A   CELL: [phone]       Call to pt - LM on home VM for pt to call the office.     No

## 2024-05-01 NOTE — ED ADULT NURSE NOTE - CHPI ED NUR SYMPTOMS POS
Subjective:     Encounter Date:03/07/2019 - [place] Office    Patient ID: Samira Concepcion is an 89 y.o.  white female from [place], [place], retired /writer for magazines.     HEALTHCARE PROVIDER: ADAM Santana  NEUROLOGIST: Unknown ([place])  [facility] ENT: Taisha Smith MD  INFECTIOUS DISEASE: Thi Farmer MD  OPHTHALMOLOGIST: Shabnam Lopez Jaime Wall MD by Olimpia Salazar, ADAM. NAUSEA

## 2024-05-01 NOTE — ED PROVIDER NOTE - CLINICAL SUMMARY MEDICAL DECISION MAKING FREE TEXT BOX
23-year-old male that presents with nonbilious nonbloody vomiting diarrhea.  Will obtain labs IV fluids pain management. pt reports improvement of symptoms. requesting discharge. Labs were ordered and reviewed.  Imaging was ordered and reviewed by me.  Appropriate medications for patient's presenting complaints were ordered and effects were reassessed.  Patient's records (prior hospital, ED visit, and/or nursing home notes if available) were reviewed.  Additional history was obtained from EMS, family, and/or PCP (where available).  Escalation to admission/observation was considered.  However patient feels much better and is comfortable with discharge.  Appropriate follow-up was arranged.     I have discussed the discharge plan with the patient. The patient agrees with the plan, as discussed.  The patient understands Emergency Department diagnosis is a preliminary diagnosis often based on limited information and that the patient must adhere to the follow-up plan as discussed.  The patient understands that if the symptoms worsen or if prescribed medications do not have the desired/planned effect that the patient may return to the Emergency Department at any time for further evaluation and treatment.

## 2024-05-04 ENCOUNTER — EMERGENCY (EMERGENCY)
Facility: HOSPITAL | Age: 24
LOS: 0 days | Discharge: HOME | End: 2024-05-04
Attending: EMERGENCY MEDICINE
Payer: COMMERCIAL

## 2024-05-04 VITALS
HEIGHT: 72 IN | RESPIRATION RATE: 18 BRPM | OXYGEN SATURATION: 100 % | HEART RATE: 77 BPM | TEMPERATURE: 96 F | SYSTOLIC BLOOD PRESSURE: 124 MMHG | WEIGHT: 210.1 LBS | DIASTOLIC BLOOD PRESSURE: 75 MMHG

## 2024-05-04 VITALS
HEIGHT: 72 IN | TEMPERATURE: 98 F | DIASTOLIC BLOOD PRESSURE: 88 MMHG | OXYGEN SATURATION: 100 % | WEIGHT: 199.96 LBS | SYSTOLIC BLOOD PRESSURE: 133 MMHG | HEART RATE: 81 BPM | RESPIRATION RATE: 18 BRPM

## 2024-05-04 DIAGNOSIS — R19.7 DIARRHEA, UNSPECIFIED: ICD-10-CM

## 2024-05-04 DIAGNOSIS — R11.2 NAUSEA WITH VOMITING, UNSPECIFIED: ICD-10-CM

## 2024-05-04 DIAGNOSIS — Z98.890 OTHER SPECIFIED POSTPROCEDURAL STATES: Chronic | ICD-10-CM

## 2024-05-04 DIAGNOSIS — Z88.1 ALLERGY STATUS TO OTHER ANTIBIOTIC AGENTS STATUS: ICD-10-CM

## 2024-05-04 DIAGNOSIS — R61 GENERALIZED HYPERHIDROSIS: ICD-10-CM

## 2024-05-04 DIAGNOSIS — R53.1 WEAKNESS: ICD-10-CM

## 2024-05-04 DIAGNOSIS — F17.200 NICOTINE DEPENDENCE, UNSPECIFIED, UNCOMPLICATED: ICD-10-CM

## 2024-05-04 DIAGNOSIS — R00.1 BRADYCARDIA, UNSPECIFIED: ICD-10-CM

## 2024-05-04 LAB
ALBUMIN SERPL ELPH-MCNC: 5.2 G/DL — SIGNIFICANT CHANGE UP (ref 3.5–5.2)
ALP SERPL-CCNC: 92 U/L — SIGNIFICANT CHANGE UP (ref 30–115)
ALT FLD-CCNC: 37 U/L — SIGNIFICANT CHANGE UP (ref 0–41)
ANION GAP SERPL CALC-SCNC: 14 MMOL/L — SIGNIFICANT CHANGE UP (ref 7–14)
AST SERPL-CCNC: 28 U/L — SIGNIFICANT CHANGE UP (ref 0–41)
BASOPHILS # BLD AUTO: 0.09 K/UL — SIGNIFICANT CHANGE UP (ref 0–0.2)
BASOPHILS NFR BLD AUTO: 0.8 % — SIGNIFICANT CHANGE UP (ref 0–1)
BILIRUB SERPL-MCNC: 0.7 MG/DL — SIGNIFICANT CHANGE UP (ref 0.2–1.2)
BUN SERPL-MCNC: 7 MG/DL — LOW (ref 10–20)
CALCIUM SERPL-MCNC: 9.7 MG/DL — SIGNIFICANT CHANGE UP (ref 8.4–10.5)
CHLORIDE SERPL-SCNC: 102 MMOL/L — SIGNIFICANT CHANGE UP (ref 98–110)
CO2 SERPL-SCNC: 25 MMOL/L — SIGNIFICANT CHANGE UP (ref 17–32)
CREAT SERPL-MCNC: 0.8 MG/DL — SIGNIFICANT CHANGE UP (ref 0.7–1.5)
EGFR: 129 ML/MIN/1.73M2 — SIGNIFICANT CHANGE UP
EOSINOPHIL # BLD AUTO: 0 K/UL — SIGNIFICANT CHANGE UP (ref 0–0.7)
EOSINOPHIL NFR BLD AUTO: 0 % — SIGNIFICANT CHANGE UP (ref 0–8)
GLUCOSE SERPL-MCNC: 114 MG/DL — HIGH (ref 70–99)
HCT VFR BLD CALC: 50.2 % — SIGNIFICANT CHANGE UP (ref 42–52)
HGB BLD-MCNC: 17 G/DL — SIGNIFICANT CHANGE UP (ref 14–18)
IMM GRANULOCYTES NFR BLD AUTO: 0.9 % — HIGH (ref 0.1–0.3)
LIDOCAIN IGE QN: 18 U/L — SIGNIFICANT CHANGE UP (ref 7–60)
LYMPHOCYTES # BLD AUTO: 1.13 K/UL — LOW (ref 1.2–3.4)
LYMPHOCYTES # BLD AUTO: 10 % — LOW (ref 20.5–51.1)
MCHC RBC-ENTMCNC: 29.4 PG — SIGNIFICANT CHANGE UP (ref 27–31)
MCHC RBC-ENTMCNC: 33.9 G/DL — SIGNIFICANT CHANGE UP (ref 32–37)
MCV RBC AUTO: 86.7 FL — SIGNIFICANT CHANGE UP (ref 80–94)
MONOCYTES # BLD AUTO: 0.83 K/UL — HIGH (ref 0.1–0.6)
MONOCYTES NFR BLD AUTO: 7.3 % — SIGNIFICANT CHANGE UP (ref 1.7–9.3)
NEUTROPHILS # BLD AUTO: 9.16 K/UL — HIGH (ref 1.4–6.5)
NEUTROPHILS NFR BLD AUTO: 81 % — HIGH (ref 42.2–75.2)
NRBC # BLD: 0 /100 WBCS — SIGNIFICANT CHANGE UP (ref 0–0)
PLATELET # BLD AUTO: 298 K/UL — SIGNIFICANT CHANGE UP (ref 130–400)
PMV BLD: 9.7 FL — SIGNIFICANT CHANGE UP (ref 7.4–10.4)
POTASSIUM SERPL-MCNC: 3.8 MMOL/L — SIGNIFICANT CHANGE UP (ref 3.5–5)
POTASSIUM SERPL-SCNC: 3.8 MMOL/L — SIGNIFICANT CHANGE UP (ref 3.5–5)
PROT SERPL-MCNC: 7.9 G/DL — SIGNIFICANT CHANGE UP (ref 6–8)
RBC # BLD: 5.79 M/UL — SIGNIFICANT CHANGE UP (ref 4.7–6.1)
RBC # FLD: 12 % — SIGNIFICANT CHANGE UP (ref 11.5–14.5)
SODIUM SERPL-SCNC: 141 MMOL/L — SIGNIFICANT CHANGE UP (ref 135–146)
WBC # BLD: 11.31 K/UL — HIGH (ref 4.8–10.8)
WBC # FLD AUTO: 11.31 K/UL — HIGH (ref 4.8–10.8)

## 2024-05-04 PROCEDURE — 96375 TX/PRO/DX INJ NEW DRUG ADDON: CPT

## 2024-05-04 PROCEDURE — 99282 EMERGENCY DEPT VISIT SF MDM: CPT

## 2024-05-04 PROCEDURE — 99284 EMERGENCY DEPT VISIT MOD MDM: CPT

## 2024-05-04 PROCEDURE — 85025 COMPLETE CBC W/AUTO DIFF WBC: CPT

## 2024-05-04 PROCEDURE — 36415 COLL VENOUS BLD VENIPUNCTURE: CPT

## 2024-05-04 PROCEDURE — 80053 COMPREHEN METABOLIC PANEL: CPT

## 2024-05-04 PROCEDURE — 93005 ELECTROCARDIOGRAM TRACING: CPT

## 2024-05-04 PROCEDURE — 83690 ASSAY OF LIPASE: CPT

## 2024-05-04 PROCEDURE — 99284 EMERGENCY DEPT VISIT MOD MDM: CPT | Mod: 25

## 2024-05-04 PROCEDURE — 96374 THER/PROPH/DIAG INJ IV PUSH: CPT

## 2024-05-04 PROCEDURE — 93010 ELECTROCARDIOGRAM REPORT: CPT

## 2024-05-04 RX ORDER — SODIUM CHLORIDE 9 MG/ML
1000 INJECTION INTRAMUSCULAR; INTRAVENOUS; SUBCUTANEOUS ONCE
Refills: 0 | Status: COMPLETED | OUTPATIENT
Start: 2024-05-04 | End: 2024-05-04

## 2024-05-04 RX ORDER — FAMOTIDINE 10 MG/ML
20 INJECTION INTRAVENOUS ONCE
Refills: 0 | Status: COMPLETED | OUTPATIENT
Start: 2024-05-04 | End: 2024-05-04

## 2024-05-04 RX ORDER — ONDANSETRON 8 MG/1
1 TABLET, FILM COATED ORAL
Qty: 2 | Refills: 0
Start: 2024-05-04 | End: 2024-05-08

## 2024-05-04 RX ORDER — METOCLOPRAMIDE HCL 10 MG
10 TABLET ORAL ONCE
Refills: 0 | Status: COMPLETED | OUTPATIENT
Start: 2024-05-04 | End: 2024-05-04

## 2024-05-04 RX ADMIN — FAMOTIDINE 20 MILLIGRAM(S): 10 INJECTION INTRAVENOUS at 13:50

## 2024-05-04 RX ADMIN — Medication 10 MILLIGRAM(S): at 13:51

## 2024-05-04 RX ADMIN — SODIUM CHLORIDE 1000 MILLILITER(S): 9 INJECTION INTRAMUSCULAR; INTRAVENOUS; SUBCUTANEOUS at 13:50

## 2024-05-04 NOTE — ED PROVIDER NOTE - NSDCPRINTRESULTS_ED_ALL_ED
Pharmacy faxed in a request for prior authorization on:01/05/23    Medication: sildenafil  Dosage: 100 mg tablets  Quantity requested:  10  Pharmacy for prescription has been selected.    Initiation of prior authorization needed.     Patient requests all Lab, Cardiology, and Radiology Results on their Discharge Instructions

## 2024-05-04 NOTE — ED ADULT TRIAGE NOTE - ESI TRIAGE ACUITY LEVEL, MLM
Instructed patient/caregiver to follow-up with primary care physician./Keep the cast/splint/dressing clean and dry./Instructed patient/caregiver regarding signs and symptoms of infection./Verbal/written post procedure instructions were given to patient/caregiver.
3

## 2024-05-04 NOTE — ED PROVIDER NOTE - OBJECTIVE STATEMENT
23-year-old male complaining of nausea, vomiting, sweating, feeling shaky, and diarrhea since May 1.  He was seen in the ED for same that day and was discharged home with Zofran.  He was feeling better but symptoms recurred again today.  He went to [facility] ED earlier but they "did not do anything for him" so he came to the ED here.  No fevers or abdominal pains.  No recent travel, sick contacts, antibiotic use, or bad food.  + Marijuana use.

## 2024-05-04 NOTE — ED ADULT TRIAGE NOTE - BIRTH SEX
Male Clindamycin Pregnancy And Lactation Text: This medication can be used in pregnancy if certain situations. Clindamycin is also present in breast milk.

## 2024-05-04 NOTE — ED PROVIDER NOTE - OBJECTIVE STATEMENT
23-year-old male no past medical history presents to the ED complaining of woke up to go to work this morning felt nauseous had 1 episode of vomiting and 1 episode of diarrhea.  Patient states took 1 Zofran.  Patient said went to work felt weak took another dose of Zofran and came to the ED.  Patient states smokes marijuana daily.  Patient denies any abdominal pain, fever, chills, chest pain or shortness of breath.  Patient states feeling better now and ready to go home.

## 2024-05-04 NOTE — ED PROVIDER NOTE - CLINICAL SUMMARY MEDICAL DECISION MAKING FREE TEXT BOX
Labs obtained.  Results reviewed and d/w pt and sister. Results of CT from 5/1 reviewed. Pt feeling improved after anti-emetic. will cont supportive care at home. Pt instructed to return if any worsening symptoms or concerns.  They verbalize understanding. f/u GI

## 2024-05-04 NOTE — ED PROVIDER NOTE - PATIENT PORTAL LINK FT
You can access the FollowMyHealth Patient Portal offered by [facility] by registering at the following website: [email or web address]. By joining [employer]’s FollowMyHealth portal, you will also be able to view your health information using other applications (apps) compatible with our system.

## 2024-05-04 NOTE — ED PROVIDER NOTE - ATTENDING APP SHARED VISIT CONTRIBUTION OF CARE
23-year-old male no significant past medical history presents for evaluation of nausea, vomiting, some diarrhea on and off for the last 4 days.  Patient was seen in the ED on May 1 and had CT scan which was negative for any acute findings.  Patient was feeling better until this morning when he went to work and started to feel sweaty and nauseous again.  Patient states he [facility] but he felt better by the time he got there because he took Zofran earlier and they cannot do anything for him so he came here.  No antibiotic use, no known sick contacts although he states his mom is admitted with pneumonia.  Patient does admit to marijuana use but has not used today.  On exam patient in NAD, AAOx3, OP clear, dry mucous membranes, abdomen is soft, positive bowel sounds nontender nondistended

## 2024-05-04 NOTE — ED PROVIDER NOTE - CARE PROVIDER_API CALL
Ruthie Botello  Internal Medicine  [address] [place] [address]  Phone: [phone]  Fax: [phone]  Follow Up Time: 1-3 Days

## 2024-05-04 NOTE — ED PROVIDER NOTE - PHYSICAL EXAMINATION
Gen: Alert, NAD, well appearing  Head: NC, AT, PERRL, EOMI, normal lids/conjunctiva  ENT: normal hearing, patent oropharynx . + dry mucous membranes  Neck: +supple, no tenderness/meningismus,  Pulm: Bilateral BS, normal resp effort, no wheeze/stridor/retractions  CV: RRR  Abd: soft, NT/ND  Mskel: no edema/erythema/cyanosis  Skin: no rash, warm/dry  Neuro: AAOx3, no sensory/motor deficits

## 2024-05-04 NOTE — ED PROVIDER NOTE - NSFOLLOWUPINSTRUCTIONS_ED_ALL_ED_FT
Our Emergency Department Referral Coordinators will be reaching out to you in the next 24-48 hours from 9:00am to 5:00pm with a follow up appointment. Please expect a phone call from the hospital in that time frame. If you do not receive a call or if you have any questions or concerns, you can reach them at   [phone]    Nausea / Vomiting    Nausea is the feeling that you have to vomit. As nausea gets worse, it can lead to vomiting. Vomiting puts you at an increased risk for dehydration. Older adults and people with other diseases or a weak immune system are at higher risk for dehydration. Drink clear fluids in small but frequent amounts as tolerated. Eat bland, easy-to-digest foods in small amounts as tolerated.    SEEK IMMEDIATE MEDICAL CARE IF YOU HAVE ANY OF THE FOLLOWING SYMPTOMS: fever, inability to keep sufficient fluids down, black or bloody vomitus, black or bloody stools, lightheadedness/dizziness, chest pain, severe headache, rash, shortness of breath, cold or clammy skin, confusion, pain with urination, or any signs of dehydration.

## 2024-05-04 NOTE — ED PROVIDER NOTE - CLINICAL SUMMARY MEDICAL DECISION MAKING FREE TEXT BOX
Patient presented with episode of nausea, vomiting and diarrhea this morning prior to arrival.  States that currently his symptoms completely resolved.  Patient was seen in the ED several days ago for the same symptoms at which point he had a negative workup including a negative CT of the abdomen and pelvis.  States that currently he is asymptomatic and tolerating p.o.  Afebrile, hemodynamically stable, abdomen completely nontender, patient very well-appearing.  Given the above, will discharge home with additional dose of as needed Zofran and outpatient follow up. Patient agreeable with plan. Agrees to return to ED for any new or worsening symptoms.

## 2024-05-04 NOTE — ED PROVIDER NOTE - PATIENT PORTAL LINK FT
You can access the FollowMyHealth Patient Portal offered by [facility] by registering at the following website: [email or web address]. By joining [employer]’s FollowMyHealth portal, you will also be able to view your health information using other applications (apps) compatible with our system.

## 2024-05-14 ENCOUNTER — TRANSCRIPTION ENCOUNTER (OUTPATIENT)
Age: 24
End: 2024-05-14

## 2025-04-17 NOTE — PRE-ANESTHESIA EVALUATION ADULT - NSANTHSNORERD_ENT_A_CORE
Progress Note  Date:2025       Room:[place] [address]-01  Patient Name:Ranjith Rodriguez     YOB: 1958     Age:66 y.o.        Subjective    Subjective:  Symptoms:  No shortness of breath, malaise, cough, chest pain, weakness, headache, chest pressure, anorexia, diarrhea or anxiety.       Review of Systems   Respiratory:  Negative for cough and shortness of breath.    Cardiovascular:  Negative for chest pain.   Gastrointestinal:  Negative for anorexia and diarrhea.   Neurological:  Negative for weakness.     Objective         Vitals Last 24 Hours:  TEMPERATURE:  Temp  Av.1 °F (36.7 °C)  Min: 97.7 °F (36.5 °C)  Max: 98.6 °F (37 °C)  RESPIRATIONS RANGE: Resp  Av.4  Min: 14  Max: 22  PULSE OXIMETRY RANGE: SpO2  Av.5 %  Min: 92 %  Max: 97 %  PULSE RANGE: Pulse  Av.3  Min: 86  Max: 100  BLOOD PRESSURE RANGE: Systolic (24hrs), Av , Min:91 , Max:166   ; Diastolic (24hrs), Av, Min:44, Max:79    I/O (24Hr):    Intake/Output Summary (Last 24 hours) at 2025 1247  Last data filed at 2025 1216  Gross per 24 hour   Intake 3296.95 ml   Output 1300 ml   Net 1996.95 ml     Objective:  General Appearance:  Comfortable, well-appearing and in no acute distress.    Vital signs: (most recent): Blood pressure (!) 103/54, pulse 96, temperature 97.7 °F (36.5 °C), temperature source Oral, resp. rate 16, height 1.803 m (5' 11\"), weight 96.6 kg (213 lb), SpO2 92%.    HEENT: Normal HEENT exam.    Lungs:  There are decreased breath sounds.    Heart: S1 normal and S2 normal.    Abdomen: Abdomen is soft.  Hyperactive bowel sounds.     Neurological: Patient is alert.    Skin:  Warm.      Labs/Imaging/Diagnostics    Labs:  CBC:  Recent Labs     25  1133   WBC 10.8   RBC 4.90   HGB 14.7   HCT 44.9   MCV 91.6   RDW 14.0        CHEMISTRIES:  Recent Labs     25  1133      K 4.4   *   CO2 23   BUN 43*   CREATININE 1.65*   GLUCOSE 91   MG 2.3     PT/INR:No results for input(s):  No

## 2025-08-12 ENCOUNTER — EMERGENCY (EMERGENCY)
Facility: HOSPITAL | Age: 25
LOS: 0 days | Discharge: HOME | End: 2025-08-12
Attending: STUDENT IN AN ORGANIZED HEALTH CARE EDUCATION/TRAINING PROGRAM
Payer: MEDICAID

## 2025-08-12 VITALS
TEMPERATURE: 99 F | HEART RATE: 91 BPM | SYSTOLIC BLOOD PRESSURE: 148 MMHG | RESPIRATION RATE: 18 BRPM | WEIGHT: 223.77 LBS | DIASTOLIC BLOOD PRESSURE: 89 MMHG | OXYGEN SATURATION: 100 %

## 2025-08-12 DIAGNOSIS — Z88.1 ALLERGY STATUS TO OTHER ANTIBIOTIC AGENTS: ICD-10-CM

## 2025-08-12 DIAGNOSIS — Z98.890 OTHER SPECIFIED POSTPROCEDURAL STATES: Chronic | ICD-10-CM

## 2025-08-12 DIAGNOSIS — K92.0 HEMATEMESIS: ICD-10-CM

## 2025-08-12 DIAGNOSIS — F10.90 ALCOHOL USE, UNSPECIFIED, UNCOMPLICATED: ICD-10-CM

## 2025-08-12 LAB
ALBUMIN SERPL ELPH-MCNC: 4.7 G/DL — SIGNIFICANT CHANGE UP (ref 3.5–5.2)
ALP SERPL-CCNC: 94 U/L — SIGNIFICANT CHANGE UP (ref 30–115)
ALT FLD-CCNC: 28 U/L — SIGNIFICANT CHANGE UP (ref 0–41)
ANION GAP SERPL CALC-SCNC: 16 MMOL/L — HIGH (ref 7–14)
APTT BLD: 26.6 SEC — LOW (ref 27–39.2)
AST SERPL-CCNC: 34 U/L — SIGNIFICANT CHANGE UP (ref 0–41)
BASOPHILS # BLD AUTO: 0.05 K/UL — SIGNIFICANT CHANGE UP (ref 0–0.2)
BASOPHILS NFR BLD AUTO: 0.5 % — SIGNIFICANT CHANGE UP (ref 0–1)
BILIRUB DIRECT SERPL-MCNC: <0.2 MG/DL — SIGNIFICANT CHANGE UP (ref 0–0.3)
BILIRUB INDIRECT FLD-MCNC: >0.5 MG/DL — SIGNIFICANT CHANGE UP (ref 0.2–1.2)
BILIRUB SERPL-MCNC: 0.7 MG/DL — SIGNIFICANT CHANGE UP (ref 0.2–1.2)
BLD GP AB SCN SERPL QL: SIGNIFICANT CHANGE UP
BUN SERPL-MCNC: 9 MG/DL — LOW (ref 10–20)
CALCIUM SERPL-MCNC: 9.3 MG/DL — SIGNIFICANT CHANGE UP (ref 8.4–10.5)
CHLORIDE SERPL-SCNC: 101 MMOL/L — SIGNIFICANT CHANGE UP (ref 98–110)
CO2 SERPL-SCNC: 23 MMOL/L — SIGNIFICANT CHANGE UP (ref 17–32)
CREAT SERPL-MCNC: 0.9 MG/DL — SIGNIFICANT CHANGE UP (ref 0.7–1.5)
EGFR: 124 ML/MIN/1.73M2 — SIGNIFICANT CHANGE UP
EGFR: 124 ML/MIN/1.73M2 — SIGNIFICANT CHANGE UP
EOSINOPHIL # BLD AUTO: 0.08 K/UL — SIGNIFICANT CHANGE UP (ref 0–0.7)
EOSINOPHIL NFR BLD AUTO: 0.9 % — SIGNIFICANT CHANGE UP (ref 0–8)
GLUCOSE SERPL-MCNC: 78 MG/DL — SIGNIFICANT CHANGE UP (ref 70–99)
HCT VFR BLD CALC: 46.7 % — SIGNIFICANT CHANGE UP (ref 42–52)
HGB BLD-MCNC: 15.8 G/DL — SIGNIFICANT CHANGE UP (ref 14–18)
IMM GRANULOCYTES NFR BLD AUTO: 0.8 % — HIGH (ref 0.1–0.3)
INR BLD: 0.87 RATIO — SIGNIFICANT CHANGE UP (ref 0.65–1.3)
LIDOCAIN IGE QN: 23 U/L — SIGNIFICANT CHANGE UP (ref 7–60)
LYMPHOCYTES # BLD AUTO: 1.97 K/UL — SIGNIFICANT CHANGE UP (ref 1.2–3.4)
LYMPHOCYTES # BLD AUTO: 21.1 % — SIGNIFICANT CHANGE UP (ref 20.5–51.1)
MCHC RBC-ENTMCNC: 29.2 PG — SIGNIFICANT CHANGE UP (ref 27–31)
MCHC RBC-ENTMCNC: 33.8 G/DL — SIGNIFICANT CHANGE UP (ref 32–37)
MCV RBC AUTO: 86.2 FL — SIGNIFICANT CHANGE UP (ref 80–94)
MONOCYTES # BLD AUTO: 0.81 K/UL — HIGH (ref 0.1–0.6)
MONOCYTES NFR BLD AUTO: 8.7 % — SIGNIFICANT CHANGE UP (ref 1.7–9.3)
NEUTROPHILS # BLD AUTO: 6.35 K/UL — SIGNIFICANT CHANGE UP (ref 1.4–6.5)
NEUTROPHILS NFR BLD AUTO: 68 % — SIGNIFICANT CHANGE UP (ref 42.2–75.2)
NRBC BLD AUTO-RTO: 0 /100 WBCS — SIGNIFICANT CHANGE UP (ref 0–0)
PLATELET # BLD AUTO: 272 K/UL — SIGNIFICANT CHANGE UP (ref 130–400)
PMV BLD: 9.9 FL — SIGNIFICANT CHANGE UP (ref 7.4–10.4)
POTASSIUM SERPL-MCNC: 3.9 MMOL/L — SIGNIFICANT CHANGE UP (ref 3.5–5)
POTASSIUM SERPL-SCNC: 3.9 MMOL/L — SIGNIFICANT CHANGE UP (ref 3.5–5)
PROT SERPL-MCNC: 7.4 G/DL — SIGNIFICANT CHANGE UP (ref 6–8)
PROTHROM AB SERPL-ACNC: 10.2 SEC — SIGNIFICANT CHANGE UP (ref 9.95–12.87)
RBC # BLD: 5.42 M/UL — SIGNIFICANT CHANGE UP (ref 4.7–6.1)
RBC # FLD: 12.1 % — SIGNIFICANT CHANGE UP (ref 11.5–14.5)
SODIUM SERPL-SCNC: 140 MMOL/L — SIGNIFICANT CHANGE UP (ref 135–146)
WBC # BLD: 9.33 K/UL — SIGNIFICANT CHANGE UP (ref 4.8–10.8)
WBC # FLD AUTO: 9.33 K/UL — SIGNIFICANT CHANGE UP (ref 4.8–10.8)

## 2025-08-12 PROCEDURE — 86901 BLOOD TYPING SEROLOGIC RH(D): CPT

## 2025-08-12 PROCEDURE — 85730 THROMBOPLASTIN TIME PARTIAL: CPT

## 2025-08-12 PROCEDURE — 85025 COMPLETE CBC W/AUTO DIFF WBC: CPT

## 2025-08-12 PROCEDURE — 71045 X-RAY EXAM CHEST 1 VIEW: CPT

## 2025-08-12 PROCEDURE — 96374 THER/PROPH/DIAG INJ IV PUSH: CPT

## 2025-08-12 PROCEDURE — 86850 RBC ANTIBODY SCREEN: CPT

## 2025-08-12 PROCEDURE — 80048 BASIC METABOLIC PNL TOTAL CA: CPT

## 2025-08-12 PROCEDURE — 83690 ASSAY OF LIPASE: CPT

## 2025-08-12 PROCEDURE — 71045 X-RAY EXAM CHEST 1 VIEW: CPT | Mod: 26

## 2025-08-12 PROCEDURE — 85610 PROTHROMBIN TIME: CPT

## 2025-08-12 PROCEDURE — 80076 HEPATIC FUNCTION PANEL: CPT

## 2025-08-12 PROCEDURE — 96375 TX/PRO/DX INJ NEW DRUG ADDON: CPT

## 2025-08-12 PROCEDURE — 99284 EMERGENCY DEPT VISIT MOD MDM: CPT | Mod: 25

## 2025-08-12 PROCEDURE — 86900 BLOOD TYPING SEROLOGIC ABO: CPT

## 2025-08-12 PROCEDURE — 36415 COLL VENOUS BLD VENIPUNCTURE: CPT

## 2025-08-12 PROCEDURE — 99285 EMERGENCY DEPT VISIT HI MDM: CPT

## 2025-08-12 RX ORDER — METOCLOPRAMIDE HCL 10 MG
10 TABLET ORAL ONCE
Refills: 0 | Status: COMPLETED | OUTPATIENT
Start: 2025-08-12 | End: 2025-08-12

## 2025-08-12 RX ADMIN — Medication 80 MILLIGRAM(S): at 20:36

## 2025-08-12 RX ADMIN — Medication 104 MILLIGRAM(S): at 20:36
